# Patient Record
Sex: MALE | Race: WHITE | Employment: OTHER | URBAN - METROPOLITAN AREA
[De-identification: names, ages, dates, MRNs, and addresses within clinical notes are randomized per-mention and may not be internally consistent; named-entity substitution may affect disease eponyms.]

---

## 2017-02-21 DIAGNOSIS — Z94.7 HISTORY OF PENETRATING KERATOPLASTY: ICD-10-CM

## 2017-02-21 RX ORDER — FLUOROMETHOLONE 0.1 %
1 SUSPENSION, DROPS(FINAL DOSAGE FORM)(ML) OPHTHALMIC (EYE) 4 TIMES DAILY
Qty: 25 ML | Refills: 2 | Status: ON HOLD | OUTPATIENT
Start: 2017-02-21 | End: 2018-06-01

## 2017-02-21 NOTE — TELEPHONE ENCOUNTER
fluorometholone (FML LIQUIFILM) 0.1 % ophthalmic suspension  Last Written Prescription Date:  4/11/16  Last Fill Quantity: 3 bottle,   # refills: 3 rfs  Last Office Visit with [facility] or [facility] prescribing provider: 2/16/16  Future Office visit:   4/4/17      Modesto Farley MD   Ophthalmology    Post corneal transplant   Dx    Blurred Vision Right Eye; Referred by Self, Referred, MD   Reason for visit    Progress Notes      CC: 82 year old male presents for blurry vision s/p penetrating keratoplasty (PK) to right eye     HPI:  - patient notes right eye not seeing as well as it did before, states it is cloudy. Eyes are comfortable     Assessment/Plan:     1. S/P Triple penetrating keratoplasty (PK) OD 2000 with Dr. Lozano  - doing great clear graft  - 2 diopter increase in astigmatism since last visit  - obtain dinesh today  - updated glasses rx given  - continue FML qday     2. Pseudophakia both eyes  - s/p Cataract extraction with Dr. Varma 2013     3. Anterior capsular opacity, right eye:  - YAG capsulotomy performed at previous visit  - remains patent and clear  - vision improves to baseline with new glasses rx     4. Posterior capsular opacity (PCO), left eye:  - continue to monitor     5. 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 Jackman     7. Steroid responder per history  - intraocular pressure ok today        return to clinic 1 year, as needed as needed if new issues arise        ~~~~~~~~~~~~~~~~~~~~~~~~~~~~~~~~~~~~~~~~~~~~~~~~~~~~~~~~~~~~~~~~     I have personally examined the patient and agree with the assessment and plan as delineated by the resident / fellow. I have confirmed the contents of the chief complaint, history of present illness, review of systems, and medical / surgical history sections and edited the note as  needed.     Modesto Farley MD              Routing refill request to provider for review/approval because:   fluorometholone (FML LIQUIFILM) 0.1 % ophthalmic suspension.    per orders SO protocol   Pharmacy request 90 day supply  25ml

## 2017-03-03 ENCOUNTER — TRANSFERRED RECORDS (OUTPATIENT)
Dept: HEALTH INFORMATION MANAGEMENT | Facility: CLINIC | Age: 82
End: 2017-03-03

## 2017-04-04 ENCOUNTER — OFFICE VISIT (OUTPATIENT)
Dept: OPHTHALMOLOGY | Facility: CLINIC | Age: 82
End: 2017-04-04
Attending: OPHTHALMOLOGY
Payer: MEDICARE

## 2017-04-04 DIAGNOSIS — Z94.7 HISTORY OF PENETRATING KERATOPLASTY: Primary | ICD-10-CM

## 2017-04-04 DIAGNOSIS — B00.52 HERPES KERATITIS: ICD-10-CM

## 2017-04-04 PROCEDURE — 99213 OFFICE O/P EST LOW 20 MIN: CPT | Mod: ZF

## 2017-04-04 ASSESSMENT — SLIT LAMP EXAM - LIDS
COMMENTS: NORMAL
COMMENTS: NORMAL

## 2017-04-04 ASSESSMENT — REFRACTION_WEARINGRX
OD_SPHERE: -2.50
SPECS_TYPE: BIFOCAL
OD_CYLINDER: +4.50
OS_SPHERE: -2.50
OS_AXIS: 160
OD_ADD: +2.50
OD_AXIS: 160
OS_ADD: +2.50
OS_CYLINDER: +2.00

## 2017-04-04 ASSESSMENT — VISUAL ACUITY
CORRECTION_TYPE: GLASSES
METHOD: SNELLEN - LINEAR
OS_CC+: +2
OD_CC: 20/60
OD_CC+: -1
OS_CC: 20/50

## 2017-04-04 ASSESSMENT — EXTERNAL EXAM - RIGHT EYE: OD_EXAM: NORMAL

## 2017-04-04 ASSESSMENT — TONOMETRY
OS_IOP_MMHG: 12
OD_IOP_MMHG: 7
IOP_METHOD: TONOPEN

## 2017-04-04 ASSESSMENT — CONF VISUAL FIELD
OS_NORMAL: 1
OD_NORMAL: 1

## 2017-04-04 ASSESSMENT — CUP TO DISC RATIO
OS_RATIO: 0.4
OD_RATIO: 0.3

## 2017-04-04 ASSESSMENT — EXTERNAL EXAM - LEFT EYE: OS_EXAM: NORMAL

## 2017-04-04 NOTE — MR AVS SNAPSHOT
After Visit Summary   2017    Scott Knott    MRN: [id number]           Patient Information     Date Of Birth          1931        Visit Information        Provider Department      2017 9:30 AM [facility]        Today's Diagnoses     History of penetrating keratoplasty - Right Eye    -  1    Herpes keratitis - Right Eye           Follow-ups after your visit        Follow-up notes from your care team     Return in about 1 year (around 2018).      Who to contact     Please call your clinic at [phone] to:    Ask questions about your health    Make or cancel appointments    Discuss your medicines    Learn about your test results    Speak to your doctor   If you have compliments or concerns about an experience at your clinic, or if you wish to file a complaint, please contact [facility] Patient Relations at [phone] or email us at [email or web address]         Additional Information About Your Visit        MyChart Information     [employer] is an electronic gateway that provides easy, online access to your medical records. With [employer], you can request a clinic appointment, read your test results, renew a prescription or communicate with your care team.     To sign up for [employer] visit the website at [email or web address]   You will be asked to enter the access code listed below, as well as some personal information. Please follow the directions to create your username and password.     Your access code is: [email or web address]  Expires: 2017  8:30 AM     Your access code will  in 90 days. If you need help or a new code, please contact your [facility] or call [phone] for assistance.        Care EveryWhere ID     This is your Care EveryWhere ID. This could be used by other organizations to access your [place] medical records  [id number]         Blood Pressure from Last 3  Encounters:   No data found for BP    Weight from Last 3 Encounters:   No data found for Wt              Today, you had the following     No orders found for display       Primary Care Provider Office Phone # Fax #    Clarissa Tai [phone]       [facility] [place] [address]  [place] [address]        Thank you!     Thank you for choosing [facility]  for your care. Our goal is always to provide you with excellent care. Hearing back from our patients is one way we can continue to improve our services. Please take a few minutes to complete the written survey that you may receive in the mail after your visit with us. Thank you!             Your Updated Medication List - Protect others around you: Learn how to safely use, store and throw away your medicines at www.disposemymeds.org.          This list is accurate as of: 4/4/17 10:33 AM.  Always use your most recent med list.                   Brand Name Dispense Instructions for use    ACYCLOVIR PO      Take 400 mg by mouth daily       amLODIPine-atorvastatin 5-80 MG per tablet    CADUET     Take 1 tablet by mouth At Bedtime       ASPIRIN PO      Take 81 mg by mouth 2 times daily       ATENOLOL PO      100 mg 1/2 tablet 2 times per day       ATORVASTATIN CALCIUM PO          ETODOLAC PO      400 mg 2 times per day with meals       fluorometholone 0.1 % ophthalmic susp    FML LIQUIFILM    25 mL    Place 1 drop Into the left eye 4 times daily Instill one drop in the right eye qd.       HYDROCHLOROTHIAZIDE PO      Take by mouth daily 25 mg 1/2 tablet       ISOSORBIDE MONONITRATE PO      Take by mouth daily 1/2  Tablet    30 mg       * NITROGLYCERIN SL      Place 0.4 mg under the tongue as needed       * nitroglycerin 0.4 MG/HR 24 hr patch    NITRODUR     Place 1 patch onto the skin daily       OMEPRAZOLE PO          TRAMADOL HCL PO          * Notice:  This list has 2 medication(s) that are the same as other medications prescribed for you. Read the  directions carefully, and ask your doctor or other care provider to review them with you.

## 2017-04-04 NOTE — PROGRESS NOTES
"CC: 85 year old male presents for blurry vision s/p penetrating keratoplasty (PK) to right eye    HPI:  - For the past 6 months, has noted a few small \"bubbles surrounded by black rings\" in both eyes. Has seen his retina specialist Dr Jackman recently who thought things looked stable. Not significantly blocking vision, and are intermittent. Denies any dryness, pain, or significant irritation. Vision has been fairly stable. Continues on FML once daily in the right eye and is taking acyclovir 400mg BID.    Assessment/Plan:    1. S/P Triple penetrating keratoplasty (PK) OD 2000 with Dr. Lozano   - doing well with great clear graft, vision improved since updated glasses    - continue FML qday    2. Pseudophakia both eyes   - s/p Cataract extraction with Dr. Varma 2013    3. Posterior capsular opacity (PCO), left eye:   - continue to monitor    4. 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 Jackman  -retina flat, attached  -has gaps in vision/blind spot from previous macular hole repair  - floaters, longstanding    6. Steroid responder per history   - intraocular pressures great today      Esequiel Moreno MD  PGY3, Dept of Ophthalmology    Return to clinic 1 year earlier as needed     ~~~~~~~~~~~~~~~~~~~~~~~~~~~~~~~~~~~~~~~~~~~~~~~~~~~~~~~~~~~~~~~~    Complete documentation of historical and exam elements from today's encounter can be found in the full encounter summary report (not reduplicated in this progress note). I personally obtained the chief complaint(s) and history of present illness.  I confirmed and edited as necessary the review of systems, past medical/surgical history, family history, social history, and examination findings as documented by others.  I examined the patient myself, and I personally reviewed the relevant tests, images, " and reports as documented above. I formulated and edited as necessary the assessment and plan and discussed the findings and management plan with the patient and family.     Modesto Farley MD, MA  Director, Cornea & Anterior Segment  [facility] Department of Ophthalmology & Visual Neuroscience

## 2017-09-08 ENCOUNTER — TRANSFERRED RECORDS (OUTPATIENT)
Dept: HEALTH INFORMATION MANAGEMENT | Facility: CLINIC | Age: 82
End: 2017-09-08

## 2018-06-01 ENCOUNTER — ANESTHESIA EVENT (OUTPATIENT)
Dept: SURGERY | Facility: CLINIC | Age: 83
End: 2018-06-01
Payer: MEDICARE

## 2018-06-01 ENCOUNTER — ANESTHESIA (OUTPATIENT)
Dept: SURGERY | Facility: CLINIC | Age: 83
End: 2018-06-01
Payer: MEDICARE

## 2018-06-01 ENCOUNTER — HOSPITAL ENCOUNTER (OUTPATIENT)
Facility: CLINIC | Age: 83
Setting detail: OBSERVATION
Discharge: HOME | End: 2018-06-02
Attending: EMERGENCY MEDICINE | Admitting: SURGERY
Payer: MEDICARE

## 2018-06-01 DIAGNOSIS — Z48.810 SURGICAL AFTERCARE, SENSE ORGANS: ICD-10-CM

## 2018-06-01 DIAGNOSIS — Z94.7 HISTORY OF PENETRATING KERATOPLASTY: ICD-10-CM

## 2018-06-01 DIAGNOSIS — S02.30XA ORBITAL FLOOR (BLOW-OUT) CLOSED FRACTURE (H): Primary | ICD-10-CM

## 2018-06-01 LAB
ABO + RH BLD: NORMAL
ABO + RH BLD: NORMAL
ANION GAP SERPL CALCULATED.3IONS-SCNC: 8 MMOL/L (ref 3–14)
BASOPHILS # BLD AUTO: 0 10E9/L (ref 0–0.2)
BASOPHILS NFR BLD AUTO: 0.3 %
BLD GP AB SCN SERPL QL: NORMAL
BLOOD BANK CMNT PATIENT-IMP: NORMAL
BUN SERPL-MCNC: 16 MG/DL (ref 7–30)
CALCIUM SERPL-MCNC: 9.1 MG/DL (ref 8.5–10.1)
CHLORIDE SERPL-SCNC: 101 MMOL/L (ref 94–109)
CO2 SERPL-SCNC: 29 MMOL/L (ref 20–32)
CREAT SERPL-MCNC: 0.88 MG/DL (ref 0.66–1.25)
DIFFERENTIAL METHOD BLD: ABNORMAL
EOSINOPHIL # BLD AUTO: 0.1 10E9/L (ref 0–0.7)
EOSINOPHIL NFR BLD AUTO: 0.9 %
ERYTHROCYTE [DISTWIDTH] IN BLOOD BY AUTOMATED COUNT: 12.9 % (ref 10–15)
GFR SERPL CREATININE-BSD FRML MDRD: 83 ML/MIN/1.7M2
GLUCOSE SERPL-MCNC: 149 MG/DL (ref 70–99)
HCT VFR BLD AUTO: 40.8 % (ref 40–53)
HGB BLD-MCNC: 13.7 G/DL (ref 13.3–17.7)
IMM GRANULOCYTES # BLD: 0 10E9/L (ref 0–0.4)
IMM GRANULOCYTES NFR BLD: 0.3 %
INR PPP: 1.05 (ref 0.86–1.14)
LYMPHOCYTES # BLD AUTO: 1.9 10E9/L (ref 0.8–5.3)
LYMPHOCYTES NFR BLD AUTO: 17.2 %
MCH RBC QN AUTO: 31.3 PG (ref 26.5–33)
MCHC RBC AUTO-ENTMCNC: 33.6 G/DL (ref 31.5–36.5)
MCV RBC AUTO: 93 FL (ref 78–100)
MONOCYTES # BLD AUTO: 1.1 10E9/L (ref 0–1.3)
MONOCYTES NFR BLD AUTO: 9.6 %
NEUTROPHILS # BLD AUTO: 8 10E9/L (ref 1.6–8.3)
NEUTROPHILS NFR BLD AUTO: 71.7 %
NRBC # BLD AUTO: 0 10*3/UL
NRBC BLD AUTO-RTO: 0 /100
PLATELET # BLD AUTO: 333 10E9/L (ref 150–450)
POTASSIUM SERPL-SCNC: 4.1 MMOL/L (ref 3.4–5.3)
RBC # BLD AUTO: 4.38 10E12/L (ref 4.4–5.9)
SODIUM SERPL-SCNC: 138 MMOL/L (ref 133–144)
SPECIMEN EXP DATE BLD: NORMAL
WBC # BLD AUTO: 11.1 10E9/L (ref 4–11)

## 2018-06-01 PROCEDURE — 80048 BASIC METABOLIC PNL TOTAL CA: CPT | Performed by: EMERGENCY MEDICINE

## 2018-06-01 PROCEDURE — 36000062 ZZH SURGERY LEVEL 4 1ST 30 MIN - UMMC: Performed by: OPHTHALMOLOGY

## 2018-06-01 PROCEDURE — 27210794 ZZH OR GENERAL SUPPLY STERILE: Performed by: OPHTHALMOLOGY

## 2018-06-01 PROCEDURE — 25000566 ZZH SEVOFLURANE, EA 15 MIN: Performed by: OPHTHALMOLOGY

## 2018-06-01 PROCEDURE — 25000128 H RX IP 250 OP 636: Performed by: STUDENT IN AN ORGANIZED HEALTH CARE EDUCATION/TRAINING PROGRAM

## 2018-06-01 PROCEDURE — 25000125 ZZHC RX 250: Performed by: OPHTHALMOLOGY

## 2018-06-01 PROCEDURE — C9399 UNCLASSIFIED DRUGS OR BIOLOG: HCPCS | Performed by: STUDENT IN AN ORGANIZED HEALTH CARE EDUCATION/TRAINING PROGRAM

## 2018-06-01 PROCEDURE — 25000128 H RX IP 250 OP 636: Performed by: OPHTHALMOLOGY

## 2018-06-01 PROCEDURE — 37000009 ZZH ANESTHESIA TECHNICAL FEE, EACH ADDTL 15 MIN: Performed by: OPHTHALMOLOGY

## 2018-06-01 PROCEDURE — 85610 PROTHROMBIN TIME: CPT | Performed by: EMERGENCY MEDICINE

## 2018-06-01 PROCEDURE — 37000008 ZZH ANESTHESIA TECHNICAL FEE, 1ST 30 MIN: Performed by: OPHTHALMOLOGY

## 2018-06-01 PROCEDURE — 93005 ELECTROCARDIOGRAM TRACING: CPT | Performed by: EMERGENCY MEDICINE

## 2018-06-01 PROCEDURE — 71000014 ZZH RECOVERY PHASE 1 LEVEL 2 FIRST HR: Performed by: OPHTHALMOLOGY

## 2018-06-01 PROCEDURE — 99285 EMERGENCY DEPT VISIT HI MDM: CPT | Mod: 25 | Performed by: EMERGENCY MEDICINE

## 2018-06-01 PROCEDURE — 86901 BLOOD TYPING SEROLOGIC RH(D): CPT | Performed by: EMERGENCY MEDICINE

## 2018-06-01 PROCEDURE — 40000170 ZZH STATISTIC PRE-PROCEDURE ASSESSMENT II: Performed by: OPHTHALMOLOGY

## 2018-06-01 PROCEDURE — 25000125 ZZHC RX 250: Performed by: STUDENT IN AN ORGANIZED HEALTH CARE EDUCATION/TRAINING PROGRAM

## 2018-06-01 PROCEDURE — 68200002 ZZH TRAUMA EVALUATION W/O CC LEVEL II: Performed by: EMERGENCY MEDICINE

## 2018-06-01 PROCEDURE — 40000065 ZZH STATISTIC EKG NON-CHARGEABLE: Performed by: EMERGENCY MEDICINE

## 2018-06-01 PROCEDURE — 86900 BLOOD TYPING SEROLOGIC ABO: CPT | Performed by: EMERGENCY MEDICINE

## 2018-06-01 PROCEDURE — 25000128 H RX IP 250 OP 636: Performed by: EMERGENCY MEDICINE

## 2018-06-01 PROCEDURE — 93010 ELECTROCARDIOGRAM REPORT: CPT | Mod: Z6 | Performed by: EMERGENCY MEDICINE

## 2018-06-01 PROCEDURE — 86850 RBC ANTIBODY SCREEN: CPT | Performed by: EMERGENCY MEDICINE

## 2018-06-01 PROCEDURE — 85025 COMPLETE CBC W/AUTO DIFF WBC: CPT | Performed by: EMERGENCY MEDICINE

## 2018-06-01 PROCEDURE — 36000064 ZZH SURGERY LEVEL 4 EA 15 ADDTL MIN - UMMC: Performed by: OPHTHALMOLOGY

## 2018-06-01 RX ORDER — ATORVASTATIN CALCIUM 40 MG/1
40 TABLET, FILM COATED ORAL AT BEDTIME
COMMUNITY

## 2018-06-01 RX ORDER — NEOMYCIN SULFATE, POLYMYXIN B SULFATE, AND DEXAMETHASONE 3.5; 10000; 1 MG/G; [USP'U]/G; MG/G
1 OINTMENT OPHTHALMIC 4 TIMES DAILY
Qty: 1 TUBE | Refills: 0 | Status: SHIPPED | OUTPATIENT
Start: 2018-06-01 | End: 2019-01-17

## 2018-06-01 RX ORDER — SODIUM CHLORIDE, SODIUM LACTATE, POTASSIUM CHLORIDE, CALCIUM CHLORIDE 600; 310; 30; 20 MG/100ML; MG/100ML; MG/100ML; MG/100ML
INJECTION, SOLUTION INTRAVENOUS CONTINUOUS
Status: DISCONTINUED | OUTPATIENT
Start: 2018-06-01 | End: 2018-06-02 | Stop reason: HOSPADM

## 2018-06-01 RX ORDER — HYDROMORPHONE HYDROCHLORIDE 1 MG/ML
0.2 INJECTION, SOLUTION INTRAMUSCULAR; INTRAVENOUS; SUBCUTANEOUS
Status: CANCELLED | OUTPATIENT
Start: 2018-06-01

## 2018-06-01 RX ORDER — TRAMADOL HYDROCHLORIDE 50 MG/1
50-100 TABLET ORAL EVERY 4 HOURS PRN
COMMUNITY

## 2018-06-01 RX ORDER — PREDNISOLONE ACETATE 10 MG/ML
1 SUSPENSION/ DROPS OPHTHALMIC 4 TIMES DAILY
Qty: 1 BOTTLE | Refills: 0 | Status: SHIPPED | OUTPATIENT
Start: 2018-06-01 | End: 2018-10-04

## 2018-06-01 RX ORDER — SODIUM CHLORIDE, SODIUM LACTATE, POTASSIUM CHLORIDE, CALCIUM CHLORIDE 600; 310; 30; 20 MG/100ML; MG/100ML; MG/100ML; MG/100ML
INJECTION, SOLUTION INTRAVENOUS CONTINUOUS
Status: CANCELLED | OUTPATIENT
Start: 2018-06-01

## 2018-06-01 RX ORDER — ONDANSETRON 4 MG/1
4 TABLET, ORALLY DISINTEGRATING ORAL EVERY 30 MIN PRN
Status: DISCONTINUED | OUTPATIENT
Start: 2018-06-01 | End: 2018-06-02 | Stop reason: HOSPADM

## 2018-06-01 RX ORDER — HYDROMORPHONE HYDROCHLORIDE 1 MG/ML
.3-.5 INJECTION, SOLUTION INTRAMUSCULAR; INTRAVENOUS; SUBCUTANEOUS EVERY 5 MIN PRN
Status: DISCONTINUED | OUTPATIENT
Start: 2018-06-01 | End: 2018-06-02 | Stop reason: HOSPADM

## 2018-06-01 RX ORDER — ATENOLOL 50 MG/1
50 TABLET ORAL 2 TIMES DAILY
COMMUNITY

## 2018-06-01 RX ORDER — AMLODIPINE BESYLATE 5 MG/1
7.5 TABLET ORAL DAILY
COMMUNITY

## 2018-06-01 RX ORDER — LABETALOL HYDROCHLORIDE 5 MG/ML
10 INJECTION, SOLUTION INTRAVENOUS
Status: DISCONTINUED | OUTPATIENT
Start: 2018-06-01 | End: 2018-06-02 | Stop reason: HOSPADM

## 2018-06-01 RX ORDER — NITROGLYCERIN 0.4 MG/1
0.4 TABLET SUBLINGUAL EVERY 5 MIN PRN
COMMUNITY

## 2018-06-01 RX ORDER — ACYCLOVIR 400 MG/1
400 TABLET ORAL DAILY
COMMUNITY

## 2018-06-01 RX ORDER — SULFASALAZINE 500 MG/1
500 TABLET ORAL 4 TIMES DAILY PRN
COMMUNITY

## 2018-06-01 RX ORDER — ONDANSETRON 2 MG/ML
4 INJECTION INTRAMUSCULAR; INTRAVENOUS EVERY 30 MIN PRN
Status: CANCELLED | OUTPATIENT
Start: 2018-06-01

## 2018-06-01 RX ORDER — LEVOFLOXACIN 5 MG/ML
750 INJECTION, SOLUTION INTRAVENOUS ONCE
Status: COMPLETED | OUTPATIENT
Start: 2018-06-01 | End: 2018-06-01

## 2018-06-01 RX ORDER — PROPOFOL 10 MG/ML
INJECTION, EMULSION INTRAVENOUS PRN
Status: DISCONTINUED | OUTPATIENT
Start: 2018-06-01 | End: 2018-06-01

## 2018-06-01 RX ORDER — SODIUM CHLORIDE, SODIUM LACTATE, POTASSIUM CHLORIDE, CALCIUM CHLORIDE 600; 310; 30; 20 MG/100ML; MG/100ML; MG/100ML; MG/100ML
INJECTION, SOLUTION INTRAVENOUS CONTINUOUS PRN
Status: DISCONTINUED | OUTPATIENT
Start: 2018-06-01 | End: 2018-06-01

## 2018-06-01 RX ORDER — BALANCED SALT SOLUTION 6.4; .75; .48; .3; 3.9; 1.7 MG/ML; MG/ML; MG/ML; MG/ML; MG/ML; MG/ML
SOLUTION OPHTHALMIC PRN
Status: DISCONTINUED | OUTPATIENT
Start: 2018-06-01 | End: 2018-06-02 | Stop reason: HOSPADM

## 2018-06-01 RX ORDER — OFLOXACIN 3 MG/ML
1 SOLUTION/ DROPS OPHTHALMIC 4 TIMES DAILY
Qty: 1 BOTTLE | Refills: 11 | Status: SHIPPED | OUTPATIENT
Start: 2018-06-01 | End: 2018-06-01

## 2018-06-01 RX ORDER — TOBRAMYCIN AND DEXAMETHASONE 3; 1 MG/ML; MG/ML
SUSPENSION/ DROPS OPHTHALMIC PRN
Status: DISCONTINUED | OUTPATIENT
Start: 2018-06-01 | End: 2018-06-02 | Stop reason: HOSPADM

## 2018-06-01 RX ORDER — NALOXONE HYDROCHLORIDE 0.4 MG/ML
.1-.4 INJECTION, SOLUTION INTRAMUSCULAR; INTRAVENOUS; SUBCUTANEOUS
Status: CANCELLED | OUTPATIENT
Start: 2018-06-01 | End: 2018-06-02

## 2018-06-01 RX ORDER — HYDROCODONE BITARTRATE AND ACETAMINOPHEN 5; 325 MG/1; MG/1
1-2 TABLET ORAL 3 TIMES DAILY PRN
COMMUNITY

## 2018-06-01 RX ORDER — OXYCODONE AND ACETAMINOPHEN 5; 325 MG/1; MG/1
1 TABLET ORAL EVERY 6 HOURS PRN
Qty: 12 TABLET | Refills: 0 | Status: SHIPPED | OUTPATIENT
Start: 2018-06-01 | End: 2018-10-04

## 2018-06-01 RX ORDER — ISOSORBIDE MONONITRATE 30 MG/1
15 TABLET, EXTENDED RELEASE ORAL DAILY
COMMUNITY

## 2018-06-01 RX ORDER — EPHEDRINE SULFATE 50 MG/ML
INJECTION, SOLUTION INTRAMUSCULAR; INTRAVENOUS; SUBCUTANEOUS PRN
Status: DISCONTINUED | OUTPATIENT
Start: 2018-06-01 | End: 2018-06-01

## 2018-06-01 RX ORDER — FENTANYL CITRATE 50 UG/ML
25-50 INJECTION, SOLUTION INTRAMUSCULAR; INTRAVENOUS
Status: DISCONTINUED | OUTPATIENT
Start: 2018-06-01 | End: 2018-06-02 | Stop reason: HOSPADM

## 2018-06-01 RX ORDER — FENTANYL CITRATE 50 UG/ML
INJECTION, SOLUTION INTRAMUSCULAR; INTRAVENOUS PRN
Status: DISCONTINUED | OUTPATIENT
Start: 2018-06-01 | End: 2018-06-01

## 2018-06-01 RX ORDER — ONDANSETRON 4 MG/1
4 TABLET, ORALLY DISINTEGRATING ORAL EVERY 30 MIN PRN
Status: CANCELLED | OUTPATIENT
Start: 2018-06-01

## 2018-06-01 RX ORDER — ONDANSETRON 2 MG/ML
4 INJECTION INTRAMUSCULAR; INTRAVENOUS EVERY 30 MIN PRN
Status: DISCONTINUED | OUTPATIENT
Start: 2018-06-01 | End: 2018-06-02 | Stop reason: HOSPADM

## 2018-06-01 RX ORDER — FENTANYL CITRATE 50 UG/ML
25-50 INJECTION, SOLUTION INTRAMUSCULAR; INTRAVENOUS EVERY 5 MIN PRN
Status: CANCELLED | OUTPATIENT
Start: 2018-06-01

## 2018-06-01 RX ORDER — OFLOXACIN 3 MG/ML
1 SOLUTION/ DROPS OPHTHALMIC 4 TIMES DAILY
Qty: 1 BOTTLE | Refills: 11 | Status: SHIPPED | OUTPATIENT
Start: 2018-06-01 | End: 2018-08-02

## 2018-06-01 RX ORDER — ETODOLAC 400 MG
400 TABLET ORAL 2 TIMES DAILY
Status: ON HOLD | COMMUNITY
End: 2018-06-02

## 2018-06-01 RX ORDER — FLUOROMETHOLONE 0.1 %
1 SUSPENSION, DROPS(FINAL DOSAGE FORM)(ML) OPHTHALMIC (EYE) AT BEDTIME
Status: ON HOLD | COMMUNITY
End: 2018-06-02

## 2018-06-01 RX ORDER — DEXAMETHASONE SODIUM PHOSPHATE 4 MG/ML
INJECTION, SOLUTION INTRA-ARTICULAR; INTRALESIONAL; INTRAMUSCULAR; INTRAVENOUS; SOFT TISSUE PRN
Status: DISCONTINUED | OUTPATIENT
Start: 2018-06-01 | End: 2018-06-02 | Stop reason: HOSPADM

## 2018-06-01 RX ORDER — ERYTHROMYCIN 5 MG/G
1 OINTMENT OPHTHALMIC 4 TIMES DAILY
Qty: 1 TUBE | Refills: 1 | Status: SHIPPED | OUTPATIENT
Start: 2018-06-01 | End: 2018-06-01

## 2018-06-01 RX ORDER — LIDOCAINE HYDROCHLORIDE AND EPINEPHRINE 10; 10 MG/ML; UG/ML
INJECTION, SOLUTION INFILTRATION; PERINEURAL PRN
Status: DISCONTINUED | OUTPATIENT
Start: 2018-06-01 | End: 2018-06-02 | Stop reason: HOSPADM

## 2018-06-01 RX ORDER — OXYCODONE AND ACETAMINOPHEN 5; 325 MG/1; MG/1
1 TABLET ORAL EVERY 6 HOURS PRN
Qty: 20 TABLET | Refills: 0 | Status: SHIPPED | OUTPATIENT
Start: 2018-06-01 | End: 2018-10-04

## 2018-06-01 RX ORDER — NEOMYCIN SULFATE, POLYMYXIN B SULFATE, AND DEXAMETHASONE 3.5; 10000; 1 MG/G; [USP'U]/G; MG/G
1 OINTMENT OPHTHALMIC 4 TIMES DAILY
Qty: 1 TUBE | Refills: 0 | Status: SHIPPED | OUTPATIENT
Start: 2018-06-01 | End: 2018-06-01

## 2018-06-01 RX ORDER — ONDANSETRON 2 MG/ML
INJECTION INTRAMUSCULAR; INTRAVENOUS PRN
Status: DISCONTINUED | OUTPATIENT
Start: 2018-06-01 | End: 2018-06-01

## 2018-06-01 RX ORDER — PREDNISOLONE ACETATE 10 MG/ML
1 SUSPENSION/ DROPS OPHTHALMIC 4 TIMES DAILY
Qty: 1 BOTTLE | Refills: 0 | Status: SHIPPED | OUTPATIENT
Start: 2018-06-01 | End: 2018-06-01

## 2018-06-01 RX ORDER — LIDOCAINE HYDROCHLORIDE 20 MG/ML
INJECTION, SOLUTION INFILTRATION; PERINEURAL PRN
Status: DISCONTINUED | OUTPATIENT
Start: 2018-06-01 | End: 2018-06-01

## 2018-06-01 RX ADMIN — PHENYLEPHRINE HYDROCHLORIDE 100 MCG: 10 INJECTION, SOLUTION INTRAMUSCULAR; INTRAVENOUS; SUBCUTANEOUS at 20:50

## 2018-06-01 RX ADMIN — PHENYLEPHRINE HYDROCHLORIDE 100 MCG: 10 INJECTION, SOLUTION INTRAMUSCULAR; INTRAVENOUS; SUBCUTANEOUS at 20:46

## 2018-06-01 RX ADMIN — Medication 5 MG: at 21:08

## 2018-06-01 RX ADMIN — SODIUM CHLORIDE, POTASSIUM CHLORIDE, SODIUM LACTATE AND CALCIUM CHLORIDE: 600; 310; 30; 20 INJECTION, SOLUTION INTRAVENOUS at 20:04

## 2018-06-01 RX ADMIN — HYDROMORPHONE HYDROCHLORIDE 0.2 MG: 1 INJECTION, SOLUTION INTRAMUSCULAR; INTRAVENOUS; SUBCUTANEOUS at 22:27

## 2018-06-01 RX ADMIN — Medication 5 MG: at 22:07

## 2018-06-01 RX ADMIN — SUGAMMADEX 200 MG: 100 INJECTION, SOLUTION INTRAVENOUS at 23:38

## 2018-06-01 RX ADMIN — PROPOFOL 160 MG: 10 INJECTION, EMULSION INTRAVENOUS at 20:11

## 2018-06-01 RX ADMIN — ONDANSETRON 4 MG: 2 INJECTION INTRAMUSCULAR; INTRAVENOUS at 23:33

## 2018-06-01 RX ADMIN — HYDROMORPHONE HYDROCHLORIDE 0.3 MG: 1 INJECTION, SOLUTION INTRAMUSCULAR; INTRAVENOUS; SUBCUTANEOUS at 22:58

## 2018-06-01 RX ADMIN — PHENYLEPHRINE HYDROCHLORIDE 100 MCG: 10 INJECTION, SOLUTION INTRAMUSCULAR; INTRAVENOUS; SUBCUTANEOUS at 20:44

## 2018-06-01 RX ADMIN — PHENYLEPHRINE HYDROCHLORIDE 100 MCG: 10 INJECTION, SOLUTION INTRAMUSCULAR; INTRAVENOUS; SUBCUTANEOUS at 21:10

## 2018-06-01 RX ADMIN — PHENYLEPHRINE HYDROCHLORIDE 50 MCG: 10 INJECTION, SOLUTION INTRAMUSCULAR; INTRAVENOUS; SUBCUTANEOUS at 20:29

## 2018-06-01 RX ADMIN — PHENYLEPHRINE HYDROCHLORIDE 100 MCG: 10 INJECTION, SOLUTION INTRAMUSCULAR; INTRAVENOUS; SUBCUTANEOUS at 22:19

## 2018-06-01 RX ADMIN — ROCURONIUM BROMIDE 20 MG: 10 INJECTION INTRAVENOUS at 22:21

## 2018-06-01 RX ADMIN — LEVOFLOXACIN 750 MG: 5 INJECTION, SOLUTION INTRAVENOUS at 20:18

## 2018-06-01 RX ADMIN — FENTANYL CITRATE 50 MCG: 50 INJECTION, SOLUTION INTRAMUSCULAR; INTRAVENOUS at 23:44

## 2018-06-01 RX ADMIN — ROCURONIUM BROMIDE 100 MG: 10 INJECTION INTRAVENOUS at 20:11

## 2018-06-01 RX ADMIN — PHENYLEPHRINE HYDROCHLORIDE 0.3 MCG/KG/MIN: 10 INJECTION, SOLUTION INTRAMUSCULAR; INTRAVENOUS; SUBCUTANEOUS at 20:51

## 2018-06-01 RX ADMIN — PHENYLEPHRINE HYDROCHLORIDE 100 MCG: 10 INJECTION, SOLUTION INTRAMUSCULAR; INTRAVENOUS; SUBCUTANEOUS at 20:38

## 2018-06-01 RX ADMIN — FENTANYL CITRATE 50 MCG: 50 INJECTION, SOLUTION INTRAMUSCULAR; INTRAVENOUS at 20:11

## 2018-06-01 RX ADMIN — PROPOFOL 40 MG: 10 INJECTION, EMULSION INTRAVENOUS at 23:40

## 2018-06-01 RX ADMIN — LIDOCAINE HYDROCHLORIDE 100 MG: 20 INJECTION, SOLUTION INFILTRATION; PERINEURAL at 20:11

## 2018-06-01 RX ADMIN — PHENYLEPHRINE HYDROCHLORIDE 50 MCG: 10 INJECTION, SOLUTION INTRAMUSCULAR; INTRAVENOUS; SUBCUTANEOUS at 20:21

## 2018-06-01 ASSESSMENT — ENCOUNTER SYMPTOMS
NECK PAIN: 0
ROS SKIN COMMENTS: SEE HPI
DIARRHEA: 0
NUMBNESS: 0
WEAKNESS: 0
SHORTNESS OF BREATH: 0
VOMITING: 0

## 2018-06-01 NOTE — IP AVS SNAPSHOT
[address] [address]    Phone:  [phone]                                       After Visit Summary   6/1/2018    Scott Knott    MRN: [id number]           After Visit Summary Signature Page     I have received my discharge instructions, and my questions have been answered. I have discussed any challenges I see with this plan with the nurse or doctor.    ..........................................................................................................................................  Patient/Patient Representative Signature      ..........................................................................................................................................  Patient Representative Print Name and Relationship to Patient    ..................................................               ................................................  Date                                            Time    ..........................................................................................................................................  Reviewed by Signature/Title    ...................................................              ..............................................  Date                                                            Time

## 2018-06-01 NOTE — ED TRIAGE NOTES
Pt was on his riding  this morning and around 11am  As he was trying to go up [facility] to mow the tractor flipped over and the blancas of the mower hit his rt eye, pt was wearing his glasses and he states his glasses were pushed into that rt eye, pt has a patch over his rt eye, pain is getting worse

## 2018-06-01 NOTE — ED NOTES
Bed: IN03  Expected date:   Expected time:   Means of arrival:   Comments:  lia Knott [birth year]   Hx of corneal tranplant 2000, ecchymosis, iris avulsion, 8-11:00, inferior wall fracture, followed by Dr. Farley here at the [facility]; trauma and ophthalmology are notified, referring ophthalmologist Dr. Bacilio Zepeda who can be reached at [phone]

## 2018-06-01 NOTE — IP AVS SNAPSHOT
MRN:[id number]                      After Visit Summary   6/1/2018    Scott Knott    MRN: [id number]           Thank you!     Thank you for choosing [facility] for your care. Our goal is always to provide you with excellent care. Hearing back from our patients is one way we can continue to improve our services. Please take a few minutes to complete the written survey that you may receive in the mail after you visit with us. Thank you!        Patient Information     Date Of Birth          8/16/1931        About your hospital stay     You were admitted on:  June 1, 2018 You last received care in the:  [address] [facility]    You were discharged on:  June 2, 2018        Reason for your hospital stay       1. Penetrating keratoplasty dehiscence with uveal prolapse, right eye  2. Eyelid laceration, right upper eyelid        Procedure performed:06/01  1. Repair of ruptured globe, right eye  2. Repair of non-margin involving right upper lid laceration                  Who to Call     For medical emergencies, please call 911.  For non-urgent questions about your medical care, please call your primary care provider or clinic, [phone]  For questions related to your surgery, please call your surgery clinic        Attending Provider     Provider Specialty    Talat Benoit MD Emergency Medicine    [facility]       Primary Care Provider Office Phone # Fax #    Clarissa Tai [phone]      After Care Instructions     Activity       Your activity upon discharge: activity as tolerated  -Shield at all times  -do not bend over  -do not lift more than a gallon of weight  -avoid dirty water in eye            Diet       Follow this diet upon discharge: Regular            Wound care and dressings       Instructions to care for your wound at home: as directed.                  Follow-up Appointments     Adult [facility] Follow-up and  "recommended labs and tests       Oral and Maxillofacial Surgeons: Orbital wall fracture was discussed with on call oculoplastics fellow, Dr. Coker.  [address] [place]  [address] [place] [address]  Appointments: [phone]    Ophthalmology (Eye) Clinic:  Follow up with plastic on chedule follow up with Oculoplastic and retina on Monday  [address]   [address] [place] [address]   Appointments: [phone]     Appointments on [place] and/or [facility] (with [facility] or [facility] provider or service). Call [phone] if you haven't heard regarding these appointments within 7 days of discharge.                  Pending Results     Date and Time Order Name Status Description    2018 1859 EKG 12-lead, tracing only Preliminary             Admission Information     Date & Time Provider Department Dept. Phone    2018 Enoc Cooper MD [address] [facility] [place] [phone]      Your Vitals Were     Blood Pressure Pulse Temperature Respirations Height Weight    140/71 (BP Location: Left arm) 82 98.1  F (36.7  C) (Oral) 14 1.778 m (5' 10\") 104.3 kg (230 lb)    Pulse Oximetry BMI (Body Mass Index)                98% 33 kg/m2          Get[employer] Information     Get[employer] lets you send messages to your doctor, view your test results, renew your prescriptions, schedule appointments and more. To sign up, go to [email or web address] . Click on \"Log in\" on the left side of the screen, which will take you to the Welcome page. Then click on \"Sign up Now\" on the right side of the page.     You will be asked to enter the access code listed below, as well as some personal information. Please follow the directions to create your username and password.     Your access code is: [email or web address]  Expires: 2018  9:48 AM     Your access code will  in 90 days. If you need help or a new code, please call your [facility] clinic or [phone].        Care EveryWhere ID     This is " your Care EveryWhere ID. This could be used by other organizations to access your [facility] medical records  [id number]        Equal Access to Services     GORDON BROWN : Hadii clement Maloney, waroseda winstonkandyha, nadinetamra kaspencer prestontad, rocco taliain hayaalotus johnstonkev kevonellibrandi wuLadi Kothari Redwood [phone].    ATENCIÓN: Si habla español, tiene a patel disposición servicios gratuitos de asistencia lingüística. Llame al [phone].    We comply with applicable federal civil rights laws and [place] laws. We do not discriminate on the basis of race, color, national origin, age, disability, sex, sexual orientation, or gender identity.               Review of your medicines      START taking        Dose / Directions    neomycin-polymyxin-dexamethasone 3.5-64831-9.1 Oint ophthalmic ointment   Commonly known as:  MAXITROL   Used for:  Surgical aftercare, sense organs        Dose:  1 Application   Place 1 Application into the right eye 4 times daily On eyelid   Quantity:  1 Tube   Refills:  0       ofloxacin 0.3 % ophthalmic solution   Commonly known as:  OCUFLOX   Used for:  Surgical aftercare, sense organs        Dose:  1 drop   Place 1 drop into the right eye 4 times daily   Quantity:  1 Bottle   Refills:  11       * oxyCODONE-acetaminophen 5-325 MG per tablet   Commonly known as:  PERCOCET   Used for:  Surgical aftercare, sense organs        Dose:  1 tablet   Take 1 tablet by mouth every 6 hours as needed for pain   Quantity:  12 tablet   Refills:  0       * oxyCODONE-acetaminophen 5-325 MG per tablet   Commonly known as:  PERCOCET   Used for:  Surgical aftercare, sense organs, History of penetrating keratoplasty        Dose:  1 tablet   Take 1 tablet by mouth every 6 hours as needed for pain   Quantity:  20 tablet   Refills:  0       prednisoLONE acetate 1 % ophthalmic susp   Commonly known as:  PRED FORTE   Used for:  Surgical aftercare, sense organs        Dose:  1 drop   Place 1 drop into the right eye 4 times  daily   Quantity:  1 Bottle   Refills:  0       * Notice:  This list has 2 medication(s) that are the same as other medications prescribed for you. Read the directions carefully, and ask your doctor or other care provider to review them with you.      CONTINUE these medicines which have NOT CHANGED        Dose / Directions    acyclovir 400 MG tablet   Commonly known as:  ZOVIRAX        Dose:  400 mg   Take 400 mg by mouth daily   Refills:  0       amLODIPine 5 MG tablet   Commonly known as:  NORVASC        Dose:  7.5 mg   Take 7.5 mg by mouth daily   Refills:  0       atenolol 50 MG tablet   Commonly known as:  TENORMIN        Dose:  50 mg   Take 50 mg by mouth 2 times daily   Refills:  0       atorvastatin 40 MG tablet   Commonly known as:  LIPITOR        Dose:  40 mg   Take 40 mg by mouth At Bedtime   Refills:  0       HYDROcodone-acetaminophen 5-325 MG per tablet   Commonly known as:  NORCO        Dose:  1-2 tablet   Take 1-2 tablets by mouth 3 times daily as needed for severe pain   Refills:  0       isosorbide mononitrate 30 MG 24 hr tablet   Commonly known as:  IMDUR        Dose:  15 mg   Take 15 mg by mouth daily   Refills:  0       nitroGLYcerin 0.4 MG sublingual tablet   Commonly known as:  NITROSTAT        Dose:  0.4 mg   Place 0.4 mg under the tongue every 5 minutes as needed for chest pain For chest pain place 1 tablet under the tongue every 5 minutes for 3 doses. If symptoms persist 5 minutes after 1st dose call 911.   Refills:  0       omeprazole 20 MG CR capsule   Commonly known as:  priLOSEC        Dose:  20 mg   Take 20 mg by mouth daily   Refills:  0       sulfaSALAzine 500 MG tablet   Commonly known as:  AZULFIDINE        Dose:  500 mg   Take 500 mg by mouth 4 times daily as needed (colitis)   Refills:  0       traMADol 50 MG tablet   Commonly known as:  ULTRAM        Dose:   mg   Take  mg by mouth every 4 hours as needed for pain or severe pain   Refills:  0         STOP taking      aspirin 81 MG tablet           etodolac 400 MG tablet   Commonly known as:  LODINE           fluorometholone 0.1 % ophthalmic susp   Commonly known as:  FML LIQUIFILM                Where to get your medicines      Some of these will need a paper prescription and others can be bought over the counter. Ask your nurse if you have questions.     Bring a paper prescription for each of these medications     neomycin-polymyxin-dexamethasone 3.5-28695-9.1 Oint ophthalmic ointment    ofloxacin 0.3 % ophthalmic solution    oxyCODONE-acetaminophen 5-325 MG per tablet    oxyCODONE-acetaminophen 5-325 MG per tablet    prednisoLONE acetate 1 % ophthalmic susp                Protect others around you: Learn how to safely use, store and throw away your medicines at www.disposemymeds.org.        ANTIBIOTIC INSTRUCTION     You've Been Prescribed an Antibiotic - Now What?  Your healthcare team thinks that you or your loved one might have an infection. Some infections can be treated with antibiotics, which are powerful, life-saving drugs. Like all medications, antibiotics have side effects and should only be used when necessary. There are some important things you should know about your antibiotic treatment.      Your healthcare team may run tests before you start taking an antibiotic.    Your team may take samples (e.g., from your blood, urine or other areas) to run tests to look for bacteria. These test can be important to determine if you need an antibiotic at all and, if you do, which antibiotic will work best.      Within a few days, your healthcare team might change or even stop your antibiotic.    Your team may start you on an antibiotic while they are working to find out what is making you sick.    Your team might change your antibiotic because test results show that a different antibiotic would be better to treat your infection.    In some cases, once your team has more information, they learn that you do not need an  antibiotic at all. They may find out that you don't have an infection, or that the antibiotic you're taking won't work against your infection. For example, an infection caused by a virus can't be treated with antibiotics. Staying on an antibiotic when you don't need it is more likely to be harmful than helpful.      You may experience side effects from your antibiotic.    Like all medications, antibiotics have side effects. Some of these can be serious.    Let you healthcare team know if you have any known allergies when you are admitted to the hospital.    One significant side effect of nearly all antibiotics is the risk of severe and sometimes deadly diarrhea caused by Clostridium difficile (C. Difficile). This occurs when a person takes antibiotics because some good germs are destroyed. Antibiotic use allows C. diificile to take over, putting patients at high risk for this serious infection.    As a patient or caregiver, it is important to understand your or your loved one's antibiotic treatment. It is especially important for caregivers to speak up when patients can't speak for themselves. Here are some important questions to ask your healthcare team.    What infection is this antibiotic treating and how do you know I have that infection?    What side effects might occur from this antibiotic?    How long will I need to take this antibiotic?    Is it safe to take this antibiotic with other medications or supplements (e.g., vitamins) that I am taking?     Are there any special directions I need to know about taking this antibiotic? For example, should I take it with food?    How will I be monitored to know whether my infection is responding to the antibiotic?    What tests may help to make sure the right antibiotic is prescribed for me?      Information provided by:  www.cdc.gov/getsmart  U.S. Department of Health and Human Services  Centers for disease Control and Prevention  National Center for Emerging and  Zoonotic Infectious Diseases  Division of Healthcare Quality Promotion        Information about OPIOIDS     PRESCRIPTION OPIOIDS: WHAT YOU NEED TO KNOW   You have a prescription for an opioid (narcotic) pain medicine. Opioids can cause addiction. If you have a history of chemical dependency of any type, you are at a higher risk of becoming addicted to opioids. Only take this medicine after all other options have been tried. Take it for as short a time and as few doses as possible.     Do not:    Drive. If you drive while taking these medicines, you could be arrested for driving under the influence (DUI).    Operate heavy machinery    Do any other dangerous activities while taking these medicines.     Drink any alcohol while taking these medicines.      Take with any other medicines that contain acetaminophen. Read all labels carefully. Look for the word  acetaminophen  or  Tylenol.  Ask your pharmacist if you have questions or are unsure.    Store your pills in a secure place, locked if possible. We will not replace any lost or stolen medicine. If you don t finish your medicine, please throw away (dispose) as directed by your pharmacist. The [place] Pollution Control Agency has more information about safe disposal: https://www.pca.[facility].[place].us/living-green/managing-unwanted-medications    All opioids tend to cause constipation. Drink plenty of water and eat foods that have a lot of fiber, such as fruits, vegetables, prune juice, apple juice and high-fiber cereal. Take a laxative (Miralax, milk of magnesia, Colace, Senna) if you don t move your bowels at least every other day.              Medication List: This is a list of all your medications and when to take them. Check marks below indicate your daily home schedule. Keep this list as a reference.      Medications           Morning Afternoon Evening Bedtime As Needed    acyclovir 400 MG tablet   Commonly known as:  ZOVIRAX   Take 400 mg by mouth daily                                 amLODIPine 5 MG tablet   Commonly known as:  NORVASC   Take 7.5 mg by mouth daily                                atenolol 50 MG tablet   Commonly known as:  TENORMIN   Take 50 mg by mouth 2 times daily                                atorvastatin 40 MG tablet   Commonly known as:  LIPITOR   Take 40 mg by mouth At Bedtime   Last time this was given:  40 mg on 6/2/2018  2:53 AM                                HYDROcodone-acetaminophen 5-325 MG per tablet   Commonly known as:  NORCO   Take 1-2 tablets by mouth 3 times daily as needed for severe pain                                isosorbide mononitrate 30 MG 24 hr tablet   Commonly known as:  IMDUR   Take 15 mg by mouth daily                                neomycin-polymyxin-dexamethasone 3.5-89325-8.1 Oint ophthalmic ointment   Commonly known as:  MAXITROL   Place 1 Application into the right eye 4 times daily On eyelid                                nitroGLYcerin 0.4 MG sublingual tablet   Commonly known as:  NITROSTAT   Place 0.4 mg under the tongue every 5 minutes as needed for chest pain For chest pain place 1 tablet under the tongue every 5 minutes for 3 doses. If symptoms persist 5 minutes after 1st dose call 911.                                ofloxacin 0.3 % ophthalmic solution   Commonly known as:  OCUFLOX   Place 1 drop into the right eye 4 times daily                                omeprazole 20 MG CR capsule   Commonly known as:  priLOSEC   Take 20 mg by mouth daily                                * oxyCODONE-acetaminophen 5-325 MG per tablet   Commonly known as:  PERCOCET   Take 1 tablet by mouth every 6 hours as needed for pain                                * oxyCODONE-acetaminophen 5-325 MG per tablet   Commonly known as:  PERCOCET   Take 1 tablet by mouth every 6 hours as needed for pain                                prednisoLONE acetate 1 % ophthalmic susp   Commonly known as:  PRED FORTE   Place 1 drop into the right eye 4 times  daily                                sulfaSALAzine 500 MG tablet   Commonly known as:  AZULFIDINE   Take 500 mg by mouth 4 times daily as needed (colitis)                                traMADol 50 MG tablet   Commonly known as:  ULTRAM   Take  mg by mouth every 4 hours as needed for pain or severe pain                                * Notice:  This list has 2 medication(s) that are the same as other medications prescribed for you. Read the directions carefully, and ask your doctor or other care provider to review them with you.

## 2018-06-02 VITALS
DIASTOLIC BLOOD PRESSURE: 71 MMHG | BODY MASS INDEX: 32.93 KG/M2 | RESPIRATION RATE: 14 BRPM | TEMPERATURE: 98.1 F | HEART RATE: 82 BPM | WEIGHT: 230 LBS | HEIGHT: 70 IN | SYSTOLIC BLOOD PRESSURE: 140 MMHG | OXYGEN SATURATION: 98 %

## 2018-06-02 LAB — GLUCOSE BLDC GLUCOMTR-MCNC: 160 MG/DL (ref 70–99)

## 2018-06-02 PROCEDURE — 25000128 H RX IP 250 OP 636: Performed by: ANESTHESIOLOGY

## 2018-06-02 PROCEDURE — G0378 HOSPITAL OBSERVATION PER HR: HCPCS

## 2018-06-02 PROCEDURE — A9270 NON-COVERED ITEM OR SERVICE: HCPCS | Mod: GY | Performed by: SURGERY

## 2018-06-02 PROCEDURE — 00000146 ZZHCL STATISTIC GLUCOSE BY METER IP

## 2018-06-02 PROCEDURE — 25000132 ZZH RX MED GY IP 250 OP 250 PS 637: Mod: GY | Performed by: SURGERY

## 2018-06-02 RX ORDER — TRAMADOL HYDROCHLORIDE 50 MG/1
50-100 TABLET ORAL EVERY 4 HOURS PRN
Status: DISCONTINUED | OUTPATIENT
Start: 2018-06-02 | End: 2018-06-02 | Stop reason: HOSPADM

## 2018-06-02 RX ORDER — ATORVASTATIN CALCIUM 40 MG/1
40 TABLET, FILM COATED ORAL AT BEDTIME
Status: DISCONTINUED | OUTPATIENT
Start: 2018-06-02 | End: 2018-06-02 | Stop reason: HOSPADM

## 2018-06-02 RX ORDER — HYDROCODONE BITARTRATE AND ACETAMINOPHEN 5; 325 MG/1; MG/1
1-2 TABLET ORAL EVERY 4 HOURS PRN
Status: DISCONTINUED | OUTPATIENT
Start: 2018-06-02 | End: 2018-06-02 | Stop reason: HOSPADM

## 2018-06-02 RX ORDER — ACYCLOVIR 400 MG/1
400 TABLET ORAL DAILY
Status: DISCONTINUED | OUTPATIENT
Start: 2018-06-02 | End: 2018-06-02

## 2018-06-02 RX ORDER — ACYCLOVIR 400 MG/1
400 TABLET ORAL DAILY
Status: DISCONTINUED | OUTPATIENT
Start: 2018-06-02 | End: 2018-06-02 | Stop reason: HOSPADM

## 2018-06-02 RX ORDER — ACETAMINOPHEN 650 MG/1
650 SUPPOSITORY RECTAL EVERY 4 HOURS PRN
Status: DISCONTINUED | OUTPATIENT
Start: 2018-06-02 | End: 2018-06-02 | Stop reason: HOSPADM

## 2018-06-02 RX ORDER — ONDANSETRON 2 MG/ML
4 INJECTION INTRAMUSCULAR; INTRAVENOUS EVERY 6 HOURS PRN
Status: DISCONTINUED | OUTPATIENT
Start: 2018-06-02 | End: 2018-06-02 | Stop reason: HOSPADM

## 2018-06-02 RX ORDER — ATENOLOL 25 MG/1
50 TABLET ORAL DAILY
Status: DISCONTINUED | OUTPATIENT
Start: 2018-06-02 | End: 2018-06-02 | Stop reason: HOSPADM

## 2018-06-02 RX ORDER — LIDOCAINE 40 MG/G
CREAM TOPICAL
Status: DISCONTINUED | OUTPATIENT
Start: 2018-06-02 | End: 2018-06-02 | Stop reason: HOSPADM

## 2018-06-02 RX ORDER — ACETAMINOPHEN 325 MG/1
650 TABLET ORAL EVERY 4 HOURS PRN
Status: DISCONTINUED | OUTPATIENT
Start: 2018-06-02 | End: 2018-06-02 | Stop reason: HOSPADM

## 2018-06-02 RX ORDER — ONDANSETRON 4 MG/1
4 TABLET, ORALLY DISINTEGRATING ORAL EVERY 6 HOURS PRN
Status: DISCONTINUED | OUTPATIENT
Start: 2018-06-02 | End: 2018-06-02 | Stop reason: HOSPADM

## 2018-06-02 RX ORDER — NALOXONE HYDROCHLORIDE 0.4 MG/ML
.1-.4 INJECTION, SOLUTION INTRAMUSCULAR; INTRAVENOUS; SUBCUTANEOUS
Status: DISCONTINUED | OUTPATIENT
Start: 2018-06-02 | End: 2018-06-02 | Stop reason: HOSPADM

## 2018-06-02 RX ORDER — IBUPROFEN 600 MG/1
600 TABLET, FILM COATED ORAL EVERY 6 HOURS SCHEDULED
Status: DISCONTINUED | OUTPATIENT
Start: 2018-06-02 | End: 2018-06-02 | Stop reason: HOSPADM

## 2018-06-02 RX ADMIN — FENTANYL CITRATE 25 MCG: 50 INJECTION INTRAMUSCULAR; INTRAVENOUS at 00:19

## 2018-06-02 RX ADMIN — FENTANYL CITRATE 50 MCG: 50 INJECTION INTRAMUSCULAR; INTRAVENOUS at 00:04

## 2018-06-02 RX ADMIN — Medication 0.2 MG: at 00:43

## 2018-06-02 RX ADMIN — ATORVASTATIN CALCIUM 40 MG: 40 TABLET ORAL at 02:53

## 2018-06-02 RX ADMIN — FENTANYL CITRATE 25 MCG: 50 INJECTION INTRAMUSCULAR; INTRAVENOUS at 00:35
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
## 2018-06-02 NOTE — PLAN OF CARE
Problem: Patient Care Overview  Goal: Plan of Care/Patient Progress Review  Outcome: Adequate for Discharge Date Met: 06/02/18  VSS. Afebrile. Eye shield in place. Noted swelling and bruising to right eye. Pt refused any medication from RN. Stated he wanted to get home and take his own medications to stay on his schedule. Discharge orders reviewed with patient and son. Verbalized understanding. PIV removed. Catheter intact. Transport took patient to pharmacy and front door. All belongings left with patient.

## 2018-06-02 NOTE — H&P
[facility], [place]    History and Physical : Trauma Service     Date of Admission:  6/1/2018    Time of Admission/Consult Request (page/call): 1900    Time of my evaluation: 1920  Consulting services:  Opthamology    Assessment & Plan   Trauma mechanism: fall off tractor  Time/date of injury: 11 a.m. 6/1/18  Known Injuries:  1. R inferior orbital wall blowout fracture with muscle entrapment  2. R corneal transplant graft avulsion  3. R eyelid laceration  4. R conjunctival hemorrhage  Other diagnoses:   1. 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 Hypertension  3. Hyperlipidemia  4. Coronary artery disease s/p PCI with stent in 2012 - on aspirin  5. Obesity (BMI 35)    Procedure:  - to OR with opthamology    Plan:  1. Admit to trauma, observation  2. To OR with opthamology now for repair of corneal avulsion / eyelid laceration  3. Management of inferior orbital fracture per opthamology  4. Resume home medications, prn norco, ultram, etodolac for pain control  5. Diet as tolerated post-op  6. Activity as tolerated with restrictions per opthamology  7. Encourage IS    Code status: full confirmed with patient.     General Cares:  GI Prophylaxis: continue home PPI  DVT Prophylaxis: hold tonight  Date of last stool/Bowel Regimen: senokot bid  Pulmonary toilet: IS, deep breathing exercises    ETOH: This patient was asked if in the last 3-6 months there has been a time when he had  5 or more drinks in a single day/outing.. Patient answer to the screening question was in the negative. No intervention needed.  Primary Care Physician   Clarissa Tai    Chief Complaint   Eye laceration    History is obtained from the patient    History of Present Illness   Scott Knott is a 86 year old male with history of HTN, HLD, CAD s/p PCI/stenting in 2012 (on asa), PE (off anticoagulation for > 1 year), chronic pain due to degenerative joint disease, obesity (BMI 35), now  "presents for management of right eye trauma.  He was at his home in [facility] this a.m. Mowing his lawn with a riding lawnmower up a hill when the mower flipped over.  The tractor hit him in the right eye, no LOC.  He was ambulatory at the scene and taken to local ER for evaluation where he was GCS 15 with obvious R eye trauma and irregular R pupil.   CT head, C-spine, and CT C/A/P were obtained and negative for injury.  CT maxillo-facial identified a R orbital wall blowout fx with muscle entrapment.  He was evaluated by an opthamologist who identified an avulsed R corneal graft and eyelid laceration and transport was arranged to [facility] for operative repair by opthamology.  He currently denies headache/nasuea/vomiting, no chest pain/dyspnea, no neck or back pain, no focal weakness/numbness.    Past Medical History    I have reviewed this patient's medical history and updated it with pertinent information if needed.   Past Medical History:   Diagnosis Date     Arthritis      Choroidal nevus     LE     Dermatochalasis of eyelid      ERM OS (epiretinal membrane, left eye)     Dr. Francisco Javier Jackman follows     HSV epithelial keratitis      Hypertension      Retinal detachment      Steroid responder, bilateral        Past Surgical History   I have reviewed this patient's surgical history and updated it with pertinent information if needed.  Past Surgical History:   Procedure Laterality Date     CATARACT IOL, RT/LT Right      CATARACT IOL, RT/LT  10/23/2013    Dr Kojo BLAKE     EYE SURGERY  6/2000    \"Triple\" KPE/PCL w/PK RE, Dr. Lozano     EYE SURGERY  8/20/03    \"RI\" right eye     EYE SURGERY      h/o AK w/compress suture RE     RETINAL REATTACHMENT Left 11/2014     STENT  04/2012     YAG CAPSULOTOMY OD (RIGHT EYE)       Prior to Admission Medications   Prior to Admission Medications   Prescriptions Last Dose Informant Patient Reported? Taking?   ACYCLOVIR PO   Yes No   Sig: Take 400 mg by mouth daily   ASPIRIN PO   " Yes No   Sig: Take 81 mg by mouth 2 times daily   ATENOLOL PO   Yes No   Si mg 1/2 tablet 2 times per day   ATORVASTATIN CALCIUM PO   Yes No   ETODOLAC PO   Yes No   Si mg 2 times per day with meals   HYDROCHLOROTHIAZIDE PO   Yes No   Sig: Take by mouth daily 25 mg 1/2 tablet   ISOSORBIDE MONONITRATE PO   Yes No   Sig: Take by mouth daily 1/2  Tablet    30 mg   NITROGLYCERIN SL   Yes No   Sig: Place 0.4 mg under the tongue as needed   OMEPRAZOLE PO   Yes No   TRAMADOL HCL PO   Yes No   amLODIPine-atorvastatin (CADUET) 5-80 MG per tablet   Yes No   Sig: Take 1 tablet by mouth At Bedtime   fluorometholone (FML LIQUIFILM) 0.1 % ophthalmic susp   No No   Sig: Place 1 drop Into the left eye 4 times daily Instill one drop in the right eye qd.   nitroglycerin (NITRODUR) 0.4 MG/HR   Yes No   Sig: Place 1 patch onto the skin daily      Facility-Administered Medications: None     Allergies   Allergies   Allergen Reactions     Contrast Dye      Tetracycline        Social History   Social History     Social History     Marital status:      Spouse name: N/A     Number of children: N/A     Years of education: N/A     Occupational History     Not on file.     Social History Main Topics     Smoking status: Former Smoker     Quit date: 1968     Smokeless tobacco: Former User     Alcohol use No     Drug use: Not on file     Sexual activity: Not on file     Other Topics Concern     Not on file     Social History Narrative       Family History   I have reviewed this patient's family history and updated it with pertinent information if needed.   Family History   Problem Relation Age of Onset     CANCER Mother      Hypertension Mother      CEREBROVASCULAR DISEASE Mother      Eye Surgery Mother      Amblyopia Son      Strabismus Son      Eye Surgery Son      Glasses (<9 y/o) Son      CANCER Paternal Aunt        Review of Systems   CONSTITUTIONAL: No fever, chills, sweats, fatigue   EYES: no visual blurring, no  double vision or visual loss  ENT: no decrease in hearing, no tinnitus, no vertigo, no hoarseness  RESPIRATORY: no shortness of breath, no cough, no sputum   CARDIOVASCULAR: no palpitations, no chest  pain, no exertional chest pain or pressure  GASTROINTESTINAL: no nausea or vomiting, or abd pain  GENITOURINARY: no dysuria, no frequency or hesitancy, no hematuria  MUSCULOSKELETAL: no weakness, no redness, no swelling, no joint pain,   SKIN: no rashes, ecchymoses, abrasions or lacerations  NEUROLOGIC: no numbness or tingling of hands, no numbness or tingling  of feet, no syncope, no tremors or weakness  PSYCHIATRIC: no sleep disturbances, no anxiety or depression    Physical Exam   Temp: 98.5  F (36.9  C) Temp src: Oral BP: 132/81 Pulse: 82   Resp: 14 SpO2: 95 % O2 Device: None (Room air)    Vital Signs with Ranges  Temp:  [98.5  F (36.9  C)] 98.5  F (36.9  C)  Pulse:  [82] 82  Resp:  [14] 14  BP: (132)/(81) 132/81  SpO2:  [95 %] 95 % 230 lbs 0 oz    Primary Survey:  Airway: patient talking  Breathing: symmetric respiratory effort bilaterally  Circulation: central pulses present and peripheral pulses present  Disability: Pupils - left 4 mm and brisk, right 4 mm and brisk     [place] Coma Scale - Total 15/15  Eye Response (E): 4  4= spontaneous,  3= to verbal/voice, 2=  to pain, 1= No response   Verbal Response (V): 5   5= Orientated, converses,  4= Confused, converses, 3= Inappropriate words,  2= Incomprehensible sounds,  1=No response   Motor Response (M): 6   6= Obeys commands, 5= Localizes to pain, 4= Withdrawal to pain, 3=Fexion to pain, 2= Extension to pain, 1= No response    Secondary Survey:  General: alert, oriented to person, place, time  Head: atraumatic, normocephalic, trachea midline  Eyes: R periorbital echymosis, L eye externally is wnl  Ears: non-inflamed external ear canals  Nose: nares patent, no drainage, nasal septum non-tender  Mouth/Throat: no exudates or erythema,  no dental tenderness or  malocclusions  Neck:  no cervical collar present. No midline posterior tenderness, full AROM without pain or tenderness   Chest/Pulmonary: normal respiratory rate and rhythm, on room air, no chest wall tenderness or deformities,   Cardiovascular: regular rate and rhythm  Abdomen: soft, non-tender, no guarding, no rebound tenderness and no tenderness to palpation  : pelvis stable to lateral compression, no segal, no urine assess  Back/Spine: no deformity, no midline tenderness, no sacral tenderness,  no step-offs and no abrasions or contusions  Musculoskel/Extremities: normal extremities, full AROM of major joints without tenderness, edema, erythema, ecchymosis, or abrasions. PP. no edema.   Hand: no gross deformities of hands or fingers. Full AROM of hand and fingers in flexion and extension.  strength equal and symmetric.   Skin: no rashes, laceration, ecchymosis, skin warm and dry.   Neuro: alert, oriented x 4. CN II-XII grossly intact. No focal deficits. Strength 5/5 x 4 extremities.  Sensation intact.  Psychiatric: affect/mood normal, cooperative, normal judgement/insight and memory intact  # Pain Assessment:   - Scott is experiencing pain due to R eye contusion. Pain management was discussed and the plan was created in a collaborative fashion.  Scott's response to the current recommendations: engaged  - Please see the plan for pain management as documented above    Data   UA RESULTS:  No results for input(s): COLOR, APPEARANCE, URINEGLC, URINEBILI, URINEKETONE, SG, UBLD, URINEPH, PROTEIN, UROBILINOGEN, NITRITE, LEUKEST, RBCU, WBCU in the last 67749 hours.   Results for orders placed or performed during the hospital encounter of 06/01/18 (from the past 24 hour(s))   EKG 12-lead, tracing only   Result Value Ref Range    Interpretation ECG Click View Image link to view waveform and result    CBC with platelets differential   Result Value Ref Range    WBC 11.1 (H) 4.0 - 11.0 10e9/L    RBC Count 4.38 (L)  4.4 - 5.9 10e12/L    Hemoglobin 13.7 13.3 - 17.7 g/dL    Hematocrit 40.8 40.0 - 53.0 %    MCV 93 78 - 100 fl    MCH 31.3 26.5 - 33.0 pg    MCHC 33.6 31.5 - 36.5 g/dL    RDW 12.9 10.0 - 15.0 %    Platelet Count 333 150 - 450 10e9/L    Diff Method Automated Method     % Neutrophils 71.7 %    % Lymphocytes 17.2 %    % Monocytes 9.6 %    % Eosinophils 0.9 %    % Basophils 0.3 %    % Immature Granulocytes 0.3 %    Nucleated RBCs 0 0 /100    Absolute Neutrophil 8.0 1.6 - 8.3 10e9/L    Absolute Lymphocytes 1.9 0.8 - 5.3 10e9/L    Absolute Monocytes 1.1 0.0 - 1.3 10e9/L    Absolute Eosinophils 0.1 0.0 - 0.7 10e9/L    Absolute Basophils 0.0 0.0 - 0.2 10e9/L    Abs Immature Granulocytes 0.0 0 - 0.4 10e9/L    Absolute Nucleated RBC 0.0        Studies:  No orders to display       Az Boykin

## 2018-06-02 NOTE — ANESTHESIA CARE TRANSFER NOTE
Patient: Scott Knott    Procedure(s):  Right Lobe and Right Upper Lid Laceration REPAIR RUPTURED GLOBE; Protected with AcuVue Oasis Contact Lens.  - Wound Class: I-Clean    Diagnosis: ruptured globe   Diagnosis Additional Information: No value filed.    Anesthesia Type:   General, ETT     Note:  Airway :Face Mask  Patient transferred to:PACU  Handoff Report: Identifed the Patient, Identified the Reponsible Provider, Reviewed the pertinent medical history, Discussed the surgical course, Reviewed Intra-OP anesthesia mangement and issues during anesthesia, Set expectations for post-procedure period and Allowed opportunity for questions and acknowledgement of understanding      Vitals: (Last set prior to Anesthesia Care Transfer)    CRNA VITALS  6/1/2018 2321 - 6/1/2018 2354      6/1/2018             Pulse: 90    SpO2: 96 %    Resp Rate (observed): (!)  1    Resp Rate (set): 10                Electronically Signed By: Ernst Burden MD  June 1, 2018  11:54 PM

## 2018-06-02 NOTE — ANESTHESIA PREPROCEDURE EVALUATION
Anesthesia Evaluation     .             ROS/MED HX    ENT/Pulmonary: Comment: multiple segmental and subsegmental pulmonary emboli in January 2014      Neurologic:       Cardiovascular: Comment: CAD status post CARLOS to proximal LAD in 2012    coronary angiogram on 03/26/2018 which showed diffuse moderate nonobstructive CAD of large dominant RCA and moderately severe CAD involving small branches in the apical LAD. Chose to pursue medical management. Continues to experience anginal symptoms weekly per PCP note    (+) Dyslipidemia, hypertension----. Taking blood thinners : . . . :. . Previous cardiac testing       METS/Exercise Tolerance:     Hematologic:         Musculoskeletal:         GI/Hepatic:         Renal/Genitourinary:     (+) chronic renal disease, type: CRI, Pt does not require dialysis,       Endo: Comment: glucose intolerance     (+) Obesity, .      Psychiatric:         Infectious Disease:         Malignancy:         Other:                                    Anesthesia Plan      History & Physical Review  History and physical reviewed and following examination; no interval change.    ASA Status:  3 .    NPO Status:  Waived due to emergency    Plan for General, ETT and RSI with Intravenous induction. Maintenance will be Balanced.    PONV prophylaxis:  Ondansetron (or other 5HT-3)       Postoperative Care  Postoperative pain management:  Multi-modal analgesia.      Consents  Anesthetic plan, risks, benefits and alternatives discussed with:  Patient..        Procedure: Procedure(s):  REPAIR RUPTURED GLOBE - Wound Class: I-Clean    HPI: Scott Knott is a 86 year old male with PMHx HTN, CAD, obesity who is undergoing above procedure for traumatic globe rupture.    PMHx/PSHx:  Past Medical History:   Diagnosis Date     Arthritis      Choroidal nevus     LE     Dermatochalasis of eyelid      ERM OS (epiretinal membrane, left eye)     Dr. Francisco Javier Jackman follows     HSV epithelial keratitis      Hypertension   "    Retinal detachment      Steroid responder, bilateral        Past Surgical History:   Procedure Laterality Date     CATARACT IOL, RT/LT Right      CATARACT IOL, RT/LT  10/23/2013    Dr Kojo BLAKE     EYE SURGERY  6/2000    \"Triple\" KPE/PCL w/PK RE, Dr. Lozano     EYE SURGERY  8/20/03    \"RI\" right eye     EYE SURGERY      h/o AK w/compress suture RE     RETINAL REATTACHMENT Left 11/2014     STENT  04/2012     YAG CAPSULOTOMY OD (RIGHT EYE)           No current facility-administered medications on file prior to encounter.   Current Outpatient Prescriptions on File Prior to Encounter:  ACYCLOVIR PO Take 400 mg by mouth daily   amLODIPine-atorvastatin (CADUET) 5-80 MG per tablet Take 1 tablet by mouth At Bedtime   ASPIRIN PO Take 81 mg by mouth 2 times daily   ATENOLOL  mg 1/2 tablet 2 times per day   ATORVASTATIN CALCIUM PO    ETODOLAC  mg 2 times per day with meals   fluorometholone (FML LIQUIFILM) 0.1 % ophthalmic susp Place 1 drop Into the left eye 4 times daily Instill one drop in the right eye qd.   HYDROCHLOROTHIAZIDE PO Take by mouth daily 25 mg 1/2 tablet   ISOSORBIDE MONONITRATE PO Take by mouth daily 1/2  Tablet    30 mg   nitroglycerin (NITRODUR) 0.4 MG/HR Place 1 patch onto the skin daily   NITROGLYCERIN SL Place 0.4 mg under the tongue as needed   OMEPRAZOLE PO    TRAMADOL HCL PO        Social Hx:   Social History   Substance Use Topics     Smoking status: Former Smoker     Quit date: 1/8/1968     Smokeless tobacco: Former User     Alcohol use No       Allergies:   Allergies   Allergen Reactions     Contrast Dye      Tetracycline          NPO Status: Per ASA Guidelines    Labs:    Blood Bank:  No results found for: ABO, RH, AS  BMP:  No results for input(s): NA, POTASSIUM, CHLORIDE, CO2, BUN, CR, GLC, KAILA in the last 75995 hours.  CBC:   No results for input(s): WBC, RBC, HGB, HCT, MCV, MCH, MCHC, RDW, PLT in the last 31448 hours.  Coags:  No results for input(s): INR, PTT, FIBR in the " last 30951 hours.

## 2018-06-02 NOTE — PHARMACY-ADMISSION MEDICATION HISTORY
Admission medication history interview status for the 6/1/2018 admission is complete. See Epic admission navigator for allergy information, pharmacy, prior to admission medications and immunization status.     Medication history interview sources:  [facility] ED Discharge Notes    Changes made to PTA medication list (reason)  Added: Norco 5-325 mg tablet   Sulfasalazine 500 mg tablet  Deleted: None  Changed: Aspirin 81 mg tablet - 2 tablets by mouth daily (not 1 tablet by mouth twice daily)       Fluorometholone opthalmic suspension - Place 1 drop into both eyes at bedtime (not 1 drop into left eye four times daily and 1 drop into right eye at bedtime)    Additional medication history information (including reliability of information, actions taken by pharmacist):    Medications were verified via the [facility] discharge list. Last doses could not be confirmed as patient is currently in surgery.    Allergies and flu shot status unable to be confirmed (not in [facility]).    Home pharmacy: [facility] in [place] per PTA list    Prior to Admission medications    Medication Sig Last Dose Taking? Auth Provider   acyclovir (ZOVIRAX) 400 MG tablet Take 400 mg by mouth daily Unknown Yes Unknown, Entered By History   amLODIPine (NORVASC) 5 MG tablet Take 7.5 mg by mouth daily Unknown Yes Unknown, Entered By History   aspirin 81 MG tablet Take 162 mg by mouth daily Unknown Yes Unknown, Entered By History   atenolol (TENORMIN) 50 MG tablet Take 50 mg by mouth 2 times daily  Unknown Yes Unknown, Entered By History   atorvastatin (LIPITOR) 40 MG tablet Take 40 mg by mouth At Bedtime Unknown Yes Unknown, Entered By History   etodolac (LODINE) 400 MG tablet Take 400 mg by mouth 2 times daily With meals Unknown Yes Unknown, Entered By History   fluorometholone (FML LIQUIFILM) 0.1 % ophthalmic susp Place 1 drop into both eyes At Bedtime Unknown Yes Unknown, Entered By History   HYDROcodone-acetaminophen (NORCO)  5-325 MG per tablet Take 1-2 tablets by mouth 3 times daily as needed for severe pain Unknown Yes Unknown, Entered By History   isosorbide mononitrate (IMDUR) 30 MG 24 hr tablet Take 15 mg by mouth daily Unknown Yes Unknown, Entered By History   nitroGLYcerin (NITROSTAT) 0.4 MG sublingual tablet Place 0.4 mg under the tongue every 5 minutes as needed for chest pain For chest pain place 1 tablet under the tongue every 5 minutes for 3 doses. If symptoms persist 5 minutes after 1st dose call 911. Unknown Yes Unknown, Entered By History   omeprazole (PRILOSEC) 20 MG CR capsule Take 20 mg by mouth daily Unknown Yes Unknown, Entered By History   sulfaSALAzine (AZULFIDINE) 500 MG tablet Take 500 mg by mouth 4 times daily as needed (colitis) Unknown Yes Unknown, Entered By History   traMADol (ULTRAM) 50 MG tablet Take  mg by mouth every 4 hours as needed for pain or severe pain Unknown Yes Unknown, Entered By History         Medication history completed by: Jing Jaramillo, 3rd Year Student Pharmacist

## 2018-06-02 NOTE — OR NURSING
RN received report from ER Triage RN at this time, Dr. Delgado paged for preop orders/consent. ER unable to send for patient at this time, OR desk notified and they will send for patient with eye cart.

## 2018-06-02 NOTE — ANESTHESIA POSTPROCEDURE EVALUATION
Patient: Scott Knott    Procedure(s):  Right Lobe and Right Upper Lid Laceration REPAIR RUPTURED GLOBE; Protected with AcuVue Oasis Contact Lens.  - Wound Class: I-Clean    Diagnosis:ruptured globe   Diagnosis Additional Information: No value filed.    Anesthesia Type:  General, ETT    Note:  Anesthesia Post Evaluation    Patient location during evaluation: PACU  Patient participation: Able to fully participate in evaluation  Level of consciousness: awake and alert  Pain management: adequate  Airway patency: patent  Cardiovascular status: acceptable  Respiratory status: acceptable  Hydration status: acceptable  PONV: none     Anesthetic complications: None          Last vitals:  Vitals:    06/02/18 0215 06/02/18 0230 06/02/18 0732   BP: 146/66 136/64 140/71   Pulse:   82   Resp:   14   Temp:   36.7  C (98.1  F)   SpO2:  97% 98%         Electronically Signed By: Cristela Snyder MD  June 2, 2018  8:47 AM

## 2018-06-02 NOTE — PROGRESS NOTES
CC: Postoperative day#1 s/p globe rupture repair and RIGHT UPPER EYELID laceration repair OD    HPI: Scott Knott is a 86 year old male who has past medical history HTN, HLD, CAD s/p PCI/stent (on aspirin), PE, chronic pain, obesity who is transferred for right globe rupture from OSH. He was riding a lawn tractor up a hill today when it overturned and he fell on the right side of his head. He had loss of vision right eye, was able to ambulate back to his house to ask for help. He placed ice over He denies loss of consciousness, and had imaging performed at OSH including head, cervical spine, and thorax, that showed right orbital floor fracture. He is NPO since 3pm, last oral intake was small crackers with 1/2 cup water.         Review of systems were otherwise negative except for that which has been stated above.    Interval: Patient was inpatient overnight. Slept well. Denies nausea or vomiting or headache.    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     Intraocular Pressure: RE  27 (with bandage contact lens+) ; LE  14     Motility: RIGHT eye decreased elevation, left: full        External/Slit Lamp Exam   RIGHT EYE                         Lids/Lashes: right upper eyelid laceration inferior to eyebrow with sutures in place, periorbital edema and ecchymosis OD                         Conj/Sclera: Temporal Subconjunctival hemorrhage, 2+ injection                         Cornea: corneal sutures in place temporally, glue and bandage contact lens+, MCE and 2+ Diabetes mellitus folds                         Ant Chamber: Deep, fibrin in pupillary area, difficult to evaluate cells                           Iris: flat                         Lens: posterior chamber intraocular lens (PCIOL) in place  LEFT                         Lids/lashes: Normal                         Conj/Sclera: White and Quiet, calcification nasal                         Cornea:  Clear                         Ant Chamber:  Deep and Quiet                         Iris: Round and Reactive                         Lens: PCIOL     Dilated Fundus Exam  LEFT:                         Anterior Vitreous: Clear                         Media: Clear                         Optic Nerve: Peripapillary atrophy                         Cup to Disc Ratio: 0.4                         Macula: Temporal and superior area of atrophy, temporal nevus, macula appears flat, scarring encroaching macula                         Vessels: Normal Caliber and Distribution            A/P:  Postoperative day#1 s/p globe rupture repair and RIGHT UPPER EYELID laceration repair OD       Difficult closure with persistent oozing after multiple sutures, s/p glue and bandage contact lens in OR  AC well formed today  MCE, K sutures intact, glue and bandage contact lens in place  Fibrin in pupillary area    Start   -Pred forte drops right eye Q2hr  (ordered): watch for IOP, may taper to four times a day  On Monday  -Ofloxacin drops right eye four times a day  (ordered)  -maxitrol ointment right eyelid four times a day  (ordered)  - preservative free eye drops four times a day  OD (ordered)  -start acyclovir 500 mg orally daily (ordered)    -Percocet ordered for pain control outpatient (script in chart)      -Shield at all times  -do not bend over  -do not lift more than a gallon of weight  -avoid dirty water in eye    Infero-nasal retinal traction on B scan OD  No vitreous hemorrhage  No view to retina  F/u with retina on monday    Inferior orbital wall fracture with inferior rectus entrapment OD   Orbital wall fracture was discussed with on call oculoplastics fellow,   John Paul. 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 Bright  Vision at baseline in OS  F/u with retina on Monday    -Schedule follow up with Oculoplastic and retina on Monday     -Appreciate trauma surgery assistance for overnight observation    Complete documentation of historical and exam elements from today's encounter can be found in the full encounter summary report (not reduplicated in this progress note). I personally obtained the chief complaint(s) and history of present illness.  I confirmed and edited as necessary the review of systems, past medical/surgical history, family history, social history, and examination findings as documented by others.  I examined the patient myself, and I personally reviewed the relevant tests, images, and reports as documented above. I formulated and edited as necessary the assessment and plan and discussed the findings and management plan with the patient and family.        DOLLY Abreu  Cornea fellow

## 2018-06-02 NOTE — PLAN OF CARE
Problem: Patient Care Overview  Goal: Plan of Care/Patient Progress Review  Outcome: Improving   Admitted approximately 0100 from PACU for right global eye rupture repair, upper eye laceration repair. Right eye covered with eye shield and gauze.  Neuros intact. VSS on 1L NC. Capnography with IPI 10. Pain is managed with PO medication tyelnol, has norco and ultram available. Tolerating water with medication. Up SBA. Voids without difficulty. Regular diet. Plan to discharge home today 6/2 after MD rounds. Continue to monitor.

## 2018-06-02 NOTE — DISCHARGE SUMMARY
[facility], [place]    Discharge Summary  Trauma Surgery Service    Date of Admission:  6/1/2018  Date of Discharge:  6/2/2018  Discharging Provider: Yin Dobson CNP  Date of Service (when I saw the patient): 06/02/18    Primary Provider: Clarissa Tai  Primary Care clinic: [place] [address] [place] [address]  Phone: [phone]  Fax number: [phone]     Discharge Diagnoses      Orbital floor (blow-out) closed fracture (H)  History of penetrating keratoplasty    Hospital Course   History of Present Illness: Scott Knott is a 86 year old male with history of HTN, HLD, CAD s/p PCI/stenting in 2012 (on asa), PE (off anticoagulation for > 1 year), chronic pain due to degenerative joint disease, obesity (BMI 35), now presents for management of right eye trauma.  He was at his home in [facility] this a.m. Mowing his lawn with a riding lawnmower up a hill when the mower flipped over.  The tractor hit him in the right eye, no LOC.  He was ambulatory at the scene and taken to local ER for evaluation where he was GCS 15 with obvious R eye trauma and irregular R pupil.   CT head, C-spine, and CT C/A/P were obtained and negative for injury.  CT maxillo-facial identified a R orbital wall blowout fx with muscle entrapment.  He was evaluated by an opthamologist who identified an avulsed R corneal graft and eyelid laceration and transport was arranged to [facility] for operative repair by opthamology.  He currently denies headache/nasuea/vomiting, no chest pain/dyspnea, no neck or back pain, no focal weakness/numbness. He underwent an exploratory eye exam and globe repair in the operating room by Dr. Delgado. Please see blelow.    Fall off tractor  The patient sustained the above injury as a result of fall off his tractor. The patient underwent tertiary examination to evaluate for additional injuries.  The systematic review did not find any other injuries.    Orbital floor  blowout and right corneal transplant graft avulsion right eyelid laceration:  Mr Knott sustained this injury as a result of a fall. He underwent a right globe and right Upper Lid Laceration repair by Dr. Delgado on 06/01/18. Please refer to his operative note for details. He was seen at the Opthalmology clinic today 06/02 and is recommended to follow up on Monday with the following recommendations:  Start   -Pred forte drops right eye Q2hr  (ordered): watch for IOP, may taper to four times a day  On Monday  -Ofloxacin drops right eye four times a day  (ordered)  -maxitrol ointment right eyelid four times a day  (ordered)  - preservative free eye drops four times a day  OD (ordered)  -start acyclovir 500 mg orally daily (ordered)     -Percocet ordered for pain control outpatient       -Shield at all times  -do not bend over  -do not lift more than a gallon of weight  -avoid dirty water in eye    Acute pain  Pain was controlled with a multi-modality approach.  The current regimen for Scott Knott includes the following, PRN acetaminophen and PRN short acting opioids.  Adequate pain control was achieved with this regimen.  We anticipate that they will taper off this regimen over the next several weeks.  Acute pain  # Discharge Pain Plan:   - During his hospitalization, Scott experienced pain due to eye trauma.  The pain plan for discharge was discussed with Scott and the plan was created in a collaborative fashion.    - Chronic/continued opioids: Percocet and tramadol     Adequate pain control was achieved with this regimen.  We anticipate that they will taper off this regimen over the next several weeks..  Significant Results and Procedures   DATE: 06/01/18  Procedure(s):  Right Lobe and Right Upper Lid Laceration REPAIR RUPTURED GLOBE; Protected with AcuVue Oasis Contact Lens.  - Wound Class: I-Clean  Surgeon(s): Surgeon(s) and Role:     * Lisette Delgado MD - Primary     * Yo Snyder MD - Resident  "- Assisting    Non-operative procedures None performed    Code Status   Full Code    Discharge Disposition   Discharged to home  Condition at discharge: Stable  Discharge VS: Blood pressure 140/71, pulse 82, temperature 98.1  F (36.7  C), temperature source Oral, resp. rate 14, height 1.778 m (5' 10\"), weight 104.3 kg (230 lb), SpO2 98 %.    Consultations This Hospital Stay   MEDICATION HISTORY IP PHARMACY CONSULT    Discharge Orders     Reason for your hospital stay   1. Penetrating keratoplasty dehiscence with uveal prolapse, right eye  2. Eyelid laceration, right upper eyelid        Procedure performed:06/01  1. Repair of ruptured globe, right eye  2. Repair of non-margin involving right upper lid laceration     Adult [facility] Follow-up and recommended labs and tests   Oral and Maxillofacial Surgeons: Orbital wall fracture was discussed with on call oculoplastics fellow, Dr. Coker.  [address] [facility]  [address] [place], [place] [id number]  Appointments: [phone]    Ophthalmology (Eye) Clinic:  Follow up with plastic on MondaySchedule follow up with Oculoplastic and retina on Monday  [address]oor [address]   [address] [place], [place] [address]   Appointments: [phone]     Appointments on [place] and/or [facility] (with [facility] or [facility] provider or service). Call [phone] if you haven't heard regarding these appointments within 7 days of discharge.     Activity   Your activity upon discharge: activity as tolerated  -Shield at all times  -do not bend over  -do not lift more than a gallon of weight  -avoid dirty water in eye     Wound care and dressings   Instructions to care for your wound at home: as directed.     Full Code     Diet   Follow this diet upon discharge: Regular       Discharge Medications   Current Discharge Medication List      START taking these medications    Details   neomycin-polymyxin-dexamethasone (MAXITROL) 3.5-54216-2.1 OINT ophthalmic ointment Place 1 Application into the " right eye 4 times daily On eyelid  Qty: 1 Tube, Refills: 0    Associated Diagnoses: Surgical aftercare, sense organs      ofloxacin (OCUFLOX) 0.3 % ophthalmic solution Place 1 drop into the right eye 4 times daily  Qty: 1 Bottle, Refills: 11    Associated Diagnoses: Surgical aftercare, sense organs      !! oxyCODONE-acetaminophen (PERCOCET) 5-325 MG per tablet Take 1 tablet by mouth every 6 hours as needed for pain  Qty: 12 tablet, Refills: 0    Associated Diagnoses: Surgical aftercare, sense organs      !! oxyCODONE-acetaminophen (PERCOCET) 5-325 MG per tablet Take 1 tablet by mouth every 6 hours as needed for pain  Qty: 20 tablet, Refills: 0    Associated Diagnoses: Surgical aftercare, sense organs; History of penetrating keratoplasty      prednisoLONE acetate (PRED FORTE) 1 % ophthalmic susp Place 1 drop into the right eye 4 times daily  Qty: 1 Bottle, Refills: 0    Associated Diagnoses: Surgical aftercare, sense organs       !! - Potential duplicate medications found. Please discuss with provider.      CONTINUE these medications which have NOT CHANGED    Details   acyclovir (ZOVIRAX) 400 MG tablet Take 400 mg by mouth daily      amLODIPine (NORVASC) 5 MG tablet Take 7.5 mg by mouth daily      atenolol (TENORMIN) 50 MG tablet Take 50 mg by mouth 2 times daily       atorvastatin (LIPITOR) 40 MG tablet Take 40 mg by mouth At Bedtime      HYDROcodone-acetaminophen (NORCO) 5-325 MG per tablet Take 1-2 tablets by mouth 3 times daily as needed for severe pain      isosorbide mononitrate (IMDUR) 30 MG 24 hr tablet Take 15 mg by mouth daily      nitroGLYcerin (NITROSTAT) 0.4 MG sublingual tablet Place 0.4 mg under the tongue every 5 minutes as needed for chest pain For chest pain place 1 tablet under the tongue every 5 minutes for 3 doses. If symptoms persist 5 minutes after 1st dose call 911.      omeprazole (PRILOSEC) 20 MG CR capsule Take 20 mg by mouth daily      sulfaSALAzine (AZULFIDINE) 500 MG tablet Take 500 mg  by mouth 4 times daily as needed (colitis)      traMADol (ULTRAM) 50 MG tablet Take  mg by mouth every 4 hours as needed for pain or severe pain         STOP taking these medications       aspirin 81 MG tablet Comments:   Reason for Stopping:         etodolac (LODINE) 400 MG tablet Comments:   Reason for Stopping:         fluorometholone (FML LIQUIFILM) 0.1 % ophthalmic susp Comments:   Reason for Stopping:             Allergies   Allergies   Allergen Reactions     Contrast Dye      Tetracycline      Data   Most Recent 3 CBC's:  Recent Labs   Lab Test  06/01/18   1908   WBC  11.1*   HGB  13.7   MCV  93   PLT  333      Most Recent 3 BMP's:  Recent Labs   Lab Test  06/01/18 1908   NA  138   POTASSIUM  4.1   CHLORIDE  101   CO2  29   BUN  16   CR  0.88   ANIONGAP  8   KAILA  9.1   GLC  149*     Most Recent 2 LFT's:No lab results found.  Most Recent INR's and Anticoagulation Dosing History:  Anticoagulation Dose History     Recent Dosing and Labs Latest Ref Rng & Units 6/1/2018    INR 0.86 - 1.14 1.05        Most Recent 3 Troponin's:No lab results found.  Most Recent 6 Bacteria Isolates From Any Culture (See EPIC Reports for Culture Details):No lab results found.  Most Recent TSH, T4 and A1c Labs:No lab results found.  No results found for this or any previous visit.    Time Spent on this Encounter   I, Yin Dobson, personally saw the patient today and spent less than or equal to 30 minutes discharging this patient.    We appreciate the opportunity to care for your patient while in the hospital.  Should you have any questions about their injuries or this discharge summary our contact information is below.    Trauma Services  [facility]   Department of Critical Care and Acute Care Surgery  [address]  [place] [address]  Office: [phone]

## 2018-06-02 NOTE — OP NOTE
Attending: Lisette ALBERTO      Resident: Yo Snyder MD      Pre-operative diagnosis:  1. Penetrating keratoplasty dehiscence with uveal prolapse, right eye  2. Eyelid laceration, right upper eyelid      Post-operative diagnosis: Same      Procedure performed:  1. Repair of ruptured globe, right eye  2. Repair of non-margin involving right upper lid laceration  3. Examination under anesthesia      Anesthesia: General      Estimated blood loss: Less than 1 mL      Indication for procedure: The patient is a 86 year old male  who was transferred from an outside hospital for ruptured globe on 06/01/18 . Examination revealed a temporal graft host junction dehiscence of right eye with uveal tissue prolapse and right upper eyelid laceration. CT orbits revealed  no radio opaque intraocular foreign body. The risks, benefits, alternatives, and considerations of surgery including infection, inflammation, bleeding, high or low eye pressure, loss of vision, loss of eye, and need for additional procedures were reviewed, and the mother elected to proceed with surgery.      Description of procedure: The patient was met in the pre-operative holding area where the history, physical, and consent forms were reviewed and verified. The left eye was marked as the correct operative site. The patient was wheeled back to the operating room, and placed under general anesthesia. The left eye was prepped and draped. A Mahesh lid speculum was inserted into the left upper and lower eyelids for adequate exposure of the globe. The microscope was swung into position, and a timeout was completed.       A temporal graft host junction (GHJ) dehiscence from 6:30 to 12:0 o'clock with iris prolapse was visualized. There was no evidence of vitreous prolapse. The iris tissue was gently re-posited using a blunt cannula. The GHJ was re-approximated with a 10-0 nylon suture. 15 interrupted 10-0 nylon sutures were placed to re-approximate the wound.  Persistent slow leak was identified, in spite to repeating multiple sutures and placing more number of sutures than done routinely. Dermabond corneal glue was applied after completely drying the GHJ with weckcell. The wound appeared dry and chamber was well formed. bandage contact lens was placed.      Subconjunctival dexamethasone (10 mg/mL),  ceftazadime (2.25 mg/0.1 mL) was injected.      The lid speculum was removed.      The surgical drapes were removed. Maxitrol eyedrop was placed in the eye.      Attention was turned to the right upper eyelid laceration. A 1 cm laceration was identified near mid-right eyebrow. Deep muscle was sutured with interrupted 6-0 vicryl suture. The skin was closed with interrupted 6-0 plain gut suture. There was a 0.5 cm laceration supero-medial to first laceration, which was closed in a similar manner.      The left eye was dilated with tropicamide. Examination revealed clear cornea, white conjunctiva, deep and quiet anterior chamber, dilated and round iris, clear lens. fundus exam revealed normal disc, there were large atrophic elevated areas superiorly and temporally with possible traction and extending to macula , clear vitreous     The patient was patched and shielded.       The patient was then brought out of general anesthesia and taken to the postoperative recovery unit in stable condition with follow up next day and followup to see the retina specialist next week.      Dr. Delgado  was scrubbed and present for the entire case.

## 2018-06-02 NOTE — ED PROVIDER NOTES
History     Chief Complaint   Patient presents with     Eye Injury     rt     HPI  Scott Knott is a 86 year old male who was mowing his yard today with his lawn tractor and states he went up an incline that he normally does not mow.  Patient states he is not sure why he went up the incline but states the next thing he knew the mower was coming over on top of him.  The front of the mower lifted up and went right over the top of him with the blancas hitting him in the face as they went down to the ground.  The patient states that his eyeglasses stuck into his right brow and that he had some trauma to the right side of his face.  Patient states he did not get knocked out but he did have to pull the stem of his glasses out of the brow.  The patient states that he was taken to [facility] where he was seen in the ER and was evaluated with CT with his head, neck, chest, abdomen and pelvis and this revealed no abnormalities except for a blowout fracture in the right side with muscle entrapment.  Further evaluation by Ophthalmology at [place] revealed a possible retinal tear with his previous history of retinal detachment and repair.  Patient was referred to the [facility] here for Ophthalmology and surgical intervention.  Patient states during the episode he did not lose consciousness and he denies any shortness of breath, chest pain, neck pain, focal numbness or weakness.  Patient denies any vomiting or diarrhea.  Patient's last tetanus immunization was given in the ER in [place].  Patient states his only blood thinner is aspirin.  Laboratory results through Care Everywhere reveal patient's creatinine to be 0.98 with a glucose of 153.  The patient's INR was not reported, and meanwhile his hemoglobin was 14.4 with a platelet count of 343.        This part of the document was transcribed by Neha Hennessy Medical Scribe.   I have reviewed the Medications, Allergies, Past Medical and Surgical  "History, and Social History in the [employer] system.  Past Medical History:   Diagnosis Date     Arthritis      Choroidal nevus     LE     Dermatochalasis of eyelid      ERM OS (epiretinal membrane, left eye)     Dr. Francisco Javier Jackman follows     HSV epithelial keratitis      Hypertension      Retinal detachment      Steroid responder, bilateral        Past Surgical History:   Procedure Laterality Date     CATARACT IOL, RT/LT Right      CATARACT IOL, RT/LT  10/23/2013    Dr Kojo BLAKE     EYE SURGERY  6/2000    \"Triple\" KPE/PCL w/PK RE, Dr. Lozano     EYE SURGERY  8/20/03    \"RI\" right eye     EYE SURGERY      h/o AK w/compress suture RE     RETINAL REATTACHMENT Left 11/2014     STENT  04/2012     YAG CAPSULOTOMY OD (RIGHT EYE)         Family History   Problem Relation Age of Onset     CANCER Mother      Hypertension Mother      CEREBROVASCULAR DISEASE Mother      Eye Surgery Mother      Amblyopia Son      Strabismus Son      Eye Surgery Son      Glasses (<9 y/o) Son      CANCER Paternal Aunt        Social History   Substance Use Topics     Smoking status: Former Smoker     Quit date: 1/8/1968     Smokeless tobacco: Former User     Alcohol use No     Previous Medications    ACYCLOVIR PO    Take 400 mg by mouth daily    AMLODIPINE-ATORVASTATIN (CADUET) 5-80 MG PER TABLET    Take 1 tablet by mouth At Bedtime    ASPIRIN PO    Take 81 mg by mouth 2 times daily    ATENOLOL PO    100 mg 1/2 tablet 2 times per day    ATORVASTATIN CALCIUM PO        ETODOLAC PO    400 mg 2 times per day with meals    FLUOROMETHOLONE (FML LIQUIFILM) 0.1 % OPHTHALMIC SUSP    Place 1 drop Into the left eye 4 times daily Instill one drop in the right eye qd.    HYDROCHLOROTHIAZIDE PO    Take by mouth daily 25 mg 1/2 tablet    ISOSORBIDE MONONITRATE PO    Take by mouth daily 1/2  Tablet    30 mg    NITROGLYCERIN (NITRODUR) 0.4 MG/HR    Place 1 patch onto the skin daily    NITROGLYCERIN SL    Place 0.4 mg under the tongue as needed    OMEPRAZOLE PO   " "     TRAMADOL HCL PO            Allergies   Allergen Reactions     Contrast Dye      Tetracycline        Review of Systems   Eyes:        See HPI   Respiratory: Negative for shortness of breath.    Cardiovascular: Negative for chest pain.   Gastrointestinal: Negative for diarrhea and vomiting.   Musculoskeletal: Negative for neck pain.   Skin:        See HPI   Neurological: Negative for syncope, weakness and numbness.   All other systems reviewed and are negative.      Physical Exam   BP: 132/81  Pulse: 82  Temp: 98.5  F (36.9  C)  Resp: 14  Height: 177.8 cm (5' 10\")  Weight: 104.3 kg (230 lb)  SpO2: 95 %      Physical Exam   Constitutional: He is oriented to person, place, and time.   Alert conversant pleasant with obvious right periorbital trauma   HENT:   Patient has a right periorbital trauma with ecchymoses   Eyes:   Left eye examined and within normal limits   Neck: Neck supple.   Nontender posteriorly   Cardiovascular: Regular rhythm.    Pulmonary/Chest: Breath sounds normal. He exhibits no tenderness.   Abdominal: Soft. There is no tenderness.   Musculoskeletal: He exhibits no edema, tenderness or deformity.   Neurological: He is alert and oriented to person, place, and time.   Grossly intact with strength bilaterally   Skin:   Ecchymoses right periorbital area   Psychiatric: He has a normal mood and affect.       ED Course     ED Course     Procedures          He was being seen by ophthalmology in the ER upon my arrival to the patient's room.    Care everywhere from male was accessed to find no EKG image, no INR, but they had given the patient a tetanus immunization.    EKG done here revealed a normal sinus rhythm at a rate of 78 with a CO interval 0.184 and a QRS duration of 0.086.  The patient had a leftward axis with no acute ST or T-wave changes significant for ischemia.  This is reviewed by me personally.    Trauma was called see the patient.    Patient had an IV initiated with bloods drawn and sent to " the lab.           Assessments & Plan (with Medical Decision Making)     I have reviewed the nursing notes.    I have reviewed the findings, diagnosis, and plan with the patient.    Final diagnoses:   Orbital floor (blow-out) closed fracture (H) - right, with muscle entrapment and retinal injury     Talat Benoit MD    6/1/2018   [facility], [place], EMERGENCY DEPARTMENT     Talat Benoit MD  06/01/18 1926

## 2018-06-02 NOTE — BRIEF OP NOTE
[facility] Brief Operative Note    Pre-operative diagnosis: ruptured globe    Post-operative diagnosis ruptured globe     Procedure: Procedure(s):  Right Globe and Right Upper Lid Laceration REPAIR RUPTURED GLOBE; Protected with AcuVue Oasis Contact Lens.  - Wound Class: I-Clean   Surgeon: Lisette Delgado MD   Assistants(s): oY Snyder MD   Estimated blood loss: Less than 10 ml    Specimens: None   Findings: See full operative report         Yo Snyder MD  PGY3

## 2018-06-04 ENCOUNTER — TRANSFERRED RECORDS (OUTPATIENT)
Dept: HEALTH INFORMATION MANAGEMENT | Facility: CLINIC | Age: 83
End: 2018-06-04

## 2018-06-04 ENCOUNTER — OFFICE VISIT (OUTPATIENT)
Dept: OPHTHALMOLOGY | Facility: CLINIC | Age: 83
End: 2018-06-04
Payer: COMMERCIAL

## 2018-06-04 ENCOUNTER — TELEPHONE (OUTPATIENT)
Dept: OPHTHALMOLOGY | Facility: CLINIC | Age: 83
End: 2018-06-04

## 2018-06-04 ENCOUNTER — CARE COORDINATION (OUTPATIENT)
Dept: CARE COORDINATION | Facility: CLINIC | Age: 83
End: 2018-06-04

## 2018-06-04 DIAGNOSIS — Z94.7 HISTORY OF PENETRATING KERATOPLASTY: Primary | ICD-10-CM

## 2018-06-04 DIAGNOSIS — S02.85XA CLOSED FRACTURE OF ORBIT, INITIAL ENCOUNTER (H): Primary | ICD-10-CM

## 2018-06-04 LAB — INTERPRETATION ECG - MUSE: NORMAL

## 2018-06-04 ASSESSMENT — TONOMETRY
OS_IOP_MMHG: 11
OD_IOP_MMHG: 8
IOP_METHOD: TONOPEN

## 2018-06-04 ASSESSMENT — CONF VISUAL FIELD
OD_INFERIOR_NASAL_RESTRICTION: 3
OD_INFERIOR_TEMPORAL_RESTRICTION: 3
OD_SUPERIOR_NASAL_RESTRICTION: 3
OD_SUPERIOR_TEMPORAL_RESTRICTION: 3
OS_NORMAL: 1

## 2018-06-04 ASSESSMENT — VISUAL ACUITY
OS_CC: 20/50
OD_CC: 20/250
METHOD: SNELLEN - LINEAR
CORRECTION_TYPE: GLASSES

## 2018-06-04 ASSESSMENT — SLIT LAMP EXAM - LIDS: COMMENTS: NORMAL

## 2018-06-04 NOTE — PROGRESS NOTES
Chief Complaints and History of Present Illnesses   Patient presents with     Consult For     Fracture Right eye     Here for evaluation of right orbital fracture.  He fell off his ride on mower and got a ruptured globe and right orbital fracture.  He has a history of a pentrating keratoplasty (PKP) and underwent repair on Friday night.  He is not having any pain on upgaze or double vision.  Vision in the right eye is blurry.         Scott Knott is a 86 year old male with the following diagnoses:   1. Closed fracture of orbit, initial encounter (H)       Right orbital floor blowout fracture with ruptured PKP  -no clinical entrapment of the inferior rectus  -hold on any consideration of repair given open globe and need for possible retinal surgery. Unlikely to require repair.  -RV here 1 month for follow up       Adele Coker MD  Ophthalmic Plastic and Reconstructive Surgery     Attending Physician Attestation:  Complete documentation of historical and exam elements from today's encounter can be found in the full encounter summary report (not reduplicated in this progress note). I personally obtained the chief complaint(s) and history of present illness.  I confirmed and edited as necessary the review of systems, past medical/surgical history, family history, social history, and examination findings as documented by others; and I examined the patient myself. I personally reviewed the relevant tests, images, and reports as documented above. I formulated and edited as necessary the assessment and plan and discussed the findings and management plan with the patient and family.  -Adele Coker MD 10:04 AM 6/4/2018

## 2018-06-04 NOTE — NURSING NOTE
Chief Complaints and History of Present Illnesses   Patient presents with     Consult For     Fracture Right eye     HPI    Affected eye(s):  Right   Symptoms:     Blurred vision   No double vision      Frequency:  Constant       Do you have eye pain now?:  No      Comments:  Friday am on June 1 - tipped over on riding - and right eye was injured from . Pt states had surgery with Dr. Delgado in hospital for ruptured globe repair. No pain.     Alpa Mosher COT 9:44 AM June 4, 2018

## 2018-06-04 NOTE — MR AVS SNAPSHOT
After Visit Summary   2018    Scott Knott    MRN: [id number]           Patient Information     Date Of Birth          1931        Visit Information        Provider Department      2018 9:00 AM Adele Coker MD [facility] Ophthalmology        Today's Diagnoses     Closed fracture of orbit, initial encounter (H)    -  1       Follow-ups after your visit        Your next 10 appointments already scheduled     Jul 10, 2018 12:15 PM CDT   (Arrive by 12:00 PM)   RETURN PLASTICS with Salina Sevilla MD   [facility] Ophthalmology ([facility])    [address] [place] [id number] [phone]              Who to contact     Please call your clinic at [phone] to:    Ask questions about your health    Make or cancel appointments    Discuss your medicines    Learn about your test results    Speak to your doctor            Additional Information About Your Visit        MyChart Information     [employer] is an electronic gateway that provides easy, online access to your medical records. With [employer], you can request a clinic appointment, read your test results, renew a prescription or communicate with your care team.     To sign up for [employer] visit the website at [email or web address]   You will be asked to enter the access code listed below, as well as some personal information. Please follow the directions to create your username and password.     Your access code is: [email or web address]  Expires: 2018  9:48 AM     Your access code will  in 90 days. If you need help or a new code, please contact your [facility] or call [phone] for assistance.        Care EveryWhere ID     This is your Care EveryWhere ID. This could be used by other organizations to access your [place] medical records  [id number]         Blood Pressure from Last 3 Encounters:   18 140/71    Weight from Last 3  Encounters:   06/01/18 104.3 kg (230 lb)              Today, you had the following     No orders found for display       Primary Care Provider Office Phone # Fax #    Clarissa Tai [phone]       [facility] [place] [address]  [place] [address]        Equal Access to Services     GORDON BROWN : Hadii aad ku hadasho Soomaali, waaxda luqadaha, qaybta kaalmada adeegyada, waxay idiin hayaan adeeg khellish la'aan ah. So [facility] [phone].    ATENCIÓN: Si habla español, tiene a patel disposición servicios gratuitos de asistencia lingüística. [facility] [phone].    We comply with applicable federal civil rights laws and [place] laws. We do not discriminate on the basis of race, color, national origin, age, disability, sex, sexual orientation, or gender identity.            Thank you!     Thank you for choosing [facility] OPHTHALMOLOGY  for your care. Our goal is always to provide you with excellent care. Hearing back from our patients is one way we can continue to improve our services. Please take a few minutes to complete the written survey that you may receive in the mail after your visit with us. Thank you!             Your Updated Medication List - Protect others around you: Learn how to safely use, store and throw away your medicines at www.disposemymeds.org.          This list is accurate as of 6/4/18 10:16 AM.  Always use your most recent med list.                   Brand Name Dispense Instructions for use Diagnosis    acyclovir 400 MG tablet    ZOVIRAX     Take 400 mg by mouth daily        amLODIPine 5 MG tablet    NORVASC     Take 7.5 mg by mouth daily        atenolol 50 MG tablet    TENORMIN     Take 50 mg by mouth 2 times daily        atorvastatin 40 MG tablet    LIPITOR     Take 40 mg by mouth At Bedtime        HYDROcodone-acetaminophen 5-325 MG per tablet    NORCO     Take 1-2 tablets by mouth 3 times daily as needed for severe pain        isosorbide mononitrate 30 MG 24 hr tablet     IMDUR     Take 15 mg by mouth daily        neomycin-polymyxin-dexamethasone 3.5-92390-3.1 Oint ophthalmic ointment    MAXITROL    1 Tube    Place 1 Application into the right eye 4 times daily On eyelid    Surgical aftercare, sense organs       nitroGLYcerin 0.4 MG sublingual tablet    NITROSTAT     Place 0.4 mg under the tongue every 5 minutes as needed for chest pain For chest pain place 1 tablet under the tongue every 5 minutes for 3 doses. If symptoms persist 5 minutes after 1st dose call 911.        ofloxacin 0.3 % ophthalmic solution    OCUFLOX    1 Bottle    Place 1 drop into the right eye 4 times daily    Surgical aftercare, sense organs       omeprazole 20 MG CR capsule    priLOSEC     Take 20 mg by mouth daily        * oxyCODONE-acetaminophen 5-325 MG per tablet    PERCOCET    12 tablet    Take 1 tablet by mouth every 6 hours as needed for pain    Surgical aftercare, sense organs       * oxyCODONE-acetaminophen 5-325 MG per tablet    PERCOCET    20 tablet    Take 1 tablet by mouth every 6 hours as needed for pain    Surgical aftercare, sense organs, History of penetrating keratoplasty       prednisoLONE acetate 1 % ophthalmic susp    PRED FORTE    1 Bottle    Place 1 drop into the right eye 4 times daily    Surgical aftercare, sense organs       sulfaSALAzine 500 MG tablet    AZULFIDINE     Take 500 mg by mouth 4 times daily as needed (colitis)        traMADol 50 MG tablet    ULTRAM     Take  mg by mouth every 4 hours as needed for pain or severe pain        * Notice:  This list has 2 medication(s) that are the same as other medications prescribed for you. Read the directions carefully, and ask your doctor or other care provider to review them with you.

## 2018-06-04 NOTE — PROGRESS NOTES
Patient has clinic visit within 24-48 hours of Discharge so no post DC follow up call is needed              Ophthalmology (Eye) Clinic:  Follow up with plastic on MondaySchedule follow up with Oculoplastic and retina on Monday  Floor [address]   [address] [place] [id number]   Appointments: [phone]        Date: 6/4/2018 Status: Arrived   Time: 9:00 AM Length: 15     Visit Type: [facility] NEW PLASTICS [[id number]]   TYLER:     Provider: Adele Coker MD Department:  EYE GENERAL   Special Needs:   Comments:     Referral Number:   Referral Status:     Enc Form Number: [id number] CSN: [id number]   Notes: fracture, pt called and will be late, ok to see

## 2018-06-15 ENCOUNTER — TRANSFERRED RECORDS (OUTPATIENT)
Dept: HEALTH INFORMATION MANAGEMENT | Facility: CLINIC | Age: 83
End: 2018-06-15

## 2018-07-10 ENCOUNTER — OFFICE VISIT (OUTPATIENT)
Dept: OPHTHALMOLOGY | Facility: CLINIC | Age: 83
End: 2018-07-10
Payer: COMMERCIAL

## 2018-07-10 DIAGNOSIS — Z94.7 HISTORY OF PENETRATING KERATOPLASTY: ICD-10-CM

## 2018-07-10 DIAGNOSIS — S02.85XA CLOSED FRACTURE OF ORBIT, INITIAL ENCOUNTER (H): Primary | ICD-10-CM

## 2018-07-10 ASSESSMENT — CONF VISUAL FIELD
OD_INFERIOR_TEMPORAL_RESTRICTION: 3
OD_SUPERIOR_NASAL_RESTRICTION: 3
OS_NORMAL: 1
OD_SUPERIOR_TEMPORAL_RESTRICTION: 3
OD_INFERIOR_NASAL_RESTRICTION: 3

## 2018-07-10 ASSESSMENT — VISUAL ACUITY
OD_CC: 20/200
CORRECTION_TYPE: GLASSES
METHOD: SNELLEN - LINEAR
OS_CC+: +1
OS_CC: 20/50

## 2018-07-10 ASSESSMENT — SLIT LAMP EXAM - LIDS: COMMENTS: NORMAL

## 2018-07-10 ASSESSMENT — TONOMETRY
IOP_METHOD: ICARE
OD_IOP_MMHG: 11
OS_IOP_MMHG: 14

## 2018-07-10 NOTE — NURSING NOTE
"Chief Complaints and History of Present Illnesses   Patient presents with     Orbital Fracture Follow Up     1 month f/u RE     HPI    Affected eye(s):  Right   Symptoms:     Blurred vision   Foreign body sensation (Comment: \"scratchy feeling\" per pt)   Tearing (Comment: \"almost constantly, but much less than what it has been\" per pt)   Eye discharge         Do you have eye pain now?:  No      Comments:    Patient notes that the medication \"is less\", started anti rejection medication 3-4 days ago:    Patient is using FML qod  Maxitrol qod    Patient is seeing a retina doctor on Friday, dr. Bright, states he has a difficult time opening the RE, notes that the RUL was droopy since before the accident.       Gertrude Gonzalez July 10, 2018 12:21 PM               "

## 2018-07-10 NOTE — PROGRESS NOTES
Chief Complaints and History of Present Illnesses   Patient presents with     Orbital Fracture Follow Up     1 month f/u RE     S/p repair of dehisced pentrating keratoplasty (PKP) with Dr. Delgado following trauma. He fell off of his riding mower. This was repaired 6/1/18. Referred for eval/management of bofx. He did see Dr. Jackman of [facility], who notes retina is healthy, and is observing.       FINDINGS: There is a right-sided inferior orbital wall blowout fracture with  evidence of muscular entrapment. The fracture involves the infraorbital foramen.  The inferior rectus muscle extends into the fracture. No substantial  intraorbital hematoma, however there is some preseptal hematoma and preseptal  soft tissue swelling about the right orbit. No fracture of the nasal bones or  trauma. Maxilla and mandible appear intact. Poor dentition with upper dentures  and multiple dental caries and missing teeth in the mandible. Oral cavity, floor  of mouth, and cervical soft tissues appear overall normal. Hemorrhage in the  right maxillary sinus, remaining paranasal sinuses are clear.       Assessment & Plan     Scott Knott is a 86 year old male with the following diagnoses:   1. Closed fracture of orbit, initial encounter (H)    2. History of penetrating keratoplasty - Right Eye       Minimal enophthalmos  Monocular diplopia  Observe bofx  F/u with Dr. Varma for care of graft and can f/u with Dr. Farley if needed.  I'm happy to help with ptosis repair if desires.         Attending Physician Attestation:  Complete documentation of historical and exam elements from today's encounter can be found in the full encounter summary report (not reduplicated in this progress note).  I personally obtained the chief complaint(s) and history of present illness.  I confirmed and edited as necessary the review of systems, past medical/surgical history, family history, social history, and examination findings as documented by others; and I  examined the patient myself.  I personally reviewed the relevant tests, images, and reports as documented above.  I formulated and edited as necessary the assessment and plan and discussed the findings and management plan with the patient and family. - Salina Sevilla MD

## 2018-07-10 NOTE — MR AVS SNAPSHOT
After Visit Summary   7/10/2018    Scott Knott    MRN: [id number]           Patient Information     Date Of Birth          8/16/1931        Visit Information        Provider Department      7/10/2018 12:15 PM Salina Sevilla MD [facility] Ophthalmology         Follow-ups after your visit        Your next 10 appointments already scheduled     Jul 10, 2018 12:15 PM CDT   (Arrive by 12:00 PM)   RETURN PLASTICS with MD MELLO Sandoval [facility] Ophthalmology ([facility] [place])    [address] [place] [id number] [phone]              Who to contact     Please call your clinic at [phone] to:    Ask questions about your health    Make or cancel appointments    Discuss your medicines    Learn about your test results    Speak to your doctor            Additional Information About Your Visit        Care EveryWhere ID     This is your Care EveryWhere ID. This could be used by other organizations to access your [place] medical records  [id number]         Blood Pressure from Last 3 Encounters:   06/02/18 140/71    Weight from Last 3 Encounters:   06/01/18 104.3 kg (230 lb)              Today, you had the following     No orders found for display       Primary Care Provider Office Phone # Fax #    Clarissa Tai [phone]       [address] [address]        Equal Access to Services     GORDON BROWN AH: Hadii clement ku hadasho Soomaali, waaxda luqadaha, qaybta kaalmada adeegyada, rocco melgarin haykristenn carmen ocampo . So [facility] [phone].    ATENCIÓN: Si habla español, tiene a patel disposición servicios gratmiraos de asistencia lingüística. ame al [phone].    We comply with applicable federal civil rights laws and [place] laws. We do not discriminate on the basis of race, color, national origin, age, disability, sex, sexual orientation, or gender identity.            Thank you!     Thank you  for choosing [facility] OPHTHALMOLOGY  for your care. Our goal is always to provide you with excellent care. Hearing back from our patients is one way we can continue to improve our services. Please take a few minutes to complete the written survey that you may receive in the mail after your visit with us. Thank you!             Your Updated Medication List - Protect others around you: Learn how to safely use, store and throw away your medicines at www.disposemymeds.org.          This list is accurate as of 7/10/18 12:00 PM.  Always use your most recent med list.                   Brand Name Dispense Instructions for use Diagnosis    acyclovir 400 MG tablet    ZOVIRAX     Take 400 mg by mouth daily        amLODIPine 5 MG tablet    NORVASC     Take 7.5 mg by mouth daily        atenolol 50 MG tablet    TENORMIN     Take 50 mg by mouth 2 times daily        atorvastatin 40 MG tablet    LIPITOR     Take 40 mg by mouth At Bedtime        HYDROcodone-acetaminophen 5-325 MG per tablet    NORCO     Take 1-2 tablets by mouth 3 times daily as needed for severe pain        isosorbide mononitrate 30 MG 24 hr tablet    IMDUR     Take 15 mg by mouth daily        neomycin-polymyxin-dexamethasone 3.5-46936-8.1 Oint ophthalmic ointment    MAXITROL    1 Tube    Place 1 Application into the right eye 4 times daily On eyelid    Surgical aftercare, sense organs       nitroGLYcerin 0.4 MG sublingual tablet    NITROSTAT     Place 0.4 mg under the tongue every 5 minutes as needed for chest pain For chest pain place 1 tablet under the tongue every 5 minutes for 3 doses. If symptoms persist 5 minutes after 1st dose call 911.        ofloxacin 0.3 % ophthalmic solution    OCUFLOX    1 Bottle    Place 1 drop into the right eye 4 times daily    Surgical aftercare, sense organs       omeprazole 20 MG CR capsule    priLOSEC     Take 20 mg by mouth daily        * oxyCODONE-acetaminophen 5-325 MG per tablet    PERCOCET    12 tablet    Take 1 tablet by  mouth every 6 hours as needed for pain    Surgical aftercare, sense organs       * oxyCODONE-acetaminophen 5-325 MG per tablet    PERCOCET    20 tablet    Take 1 tablet by mouth every 6 hours as needed for pain    Surgical aftercare, sense organs, History of penetrating keratoplasty       prednisoLONE acetate 1 % ophthalmic susp    PRED FORTE    1 Bottle    Place 1 drop into the right eye 4 times daily    Surgical aftercare, sense organs       sulfaSALAzine 500 MG tablet    AZULFIDINE     Take 500 mg by mouth 4 times daily as needed (colitis)        traMADol 50 MG tablet    ULTRAM     Take  mg by mouth every 4 hours as needed for pain or severe pain        * Notice:  This list has 2 medication(s) that are the same as other medications prescribed for you. Read the directions carefully, and ask your doctor or other care provider to review them with you.

## 2018-07-13 ENCOUNTER — TELEPHONE (OUTPATIENT)
Dept: OPHTHALMOLOGY | Facility: CLINIC | Age: 83
End: 2018-07-13

## 2018-07-13 ENCOUNTER — TRANSFERRED RECORDS (OUTPATIENT)
Dept: HEALTH INFORMATION MANAGEMENT | Facility: CLINIC | Age: 83
End: 2018-07-13

## 2018-07-13 NOTE — TELEPHONE ENCOUNTER
Health Call Center    Phone Message    May a detailed message be left on voicemail: yes    Reason for Call: Other: The pt's wife Micaela is asking about the pt getting eyelid repair. The pt saw Dr Downing on 7.10.18. The questions is - Does the pt need another consult, or can they schedule the repair surg now? Please call the pt and his wife to discuss. If they need a consult, can it be on the same day as Dr Farley? Thanks     Action Taken: Message routed to:  [facility] ([facility]

## 2018-07-16 NOTE — TELEPHONE ENCOUNTER
After consulting with Dr. Sevilla, he asked patient be seen in clinic first for new patient chaparrita    I called patient to schedule and he decided he will wait to schedule anything until after he sees Dr. Farley.      Patient had no further questions.

## 2018-08-02 ENCOUNTER — OFFICE VISIT (OUTPATIENT)
Dept: OPHTHALMOLOGY | Facility: CLINIC | Age: 83
End: 2018-08-02
Attending: OPHTHALMOLOGY
Payer: MEDICARE

## 2018-08-02 DIAGNOSIS — Z48.810 SURGICAL AFTERCARE, SENSE ORGANS: ICD-10-CM

## 2018-08-02 PROCEDURE — G0463 HOSPITAL OUTPT CLINIC VISIT: HCPCS | Mod: ZF

## 2018-08-02 RX ORDER — OFLOXACIN 3 MG/ML
1 SOLUTION/ DROPS OPHTHALMIC 2 TIMES DAILY
Qty: 1 BOTTLE | Refills: 3 | Status: SHIPPED | OUTPATIENT
Start: 2018-08-02 | End: 2019-01-17

## 2018-08-02 ASSESSMENT — VISUAL ACUITY
CORRECTION_TYPE: GLASSES
OS_CC: 20/40
OD_PH_SC: 20/125
OD_PH_CC: 20/125
OD_CC: 20/300
METHOD: SNELLEN - LINEAR

## 2018-08-02 ASSESSMENT — PACHYMETRY
OD_CT(UM): 626
OS_CT(UM): 524

## 2018-08-02 ASSESSMENT — TONOMETRY
OS_IOP_MMHG: 11
IOP_METHOD: ICARE
OD_IOP_MMHG: 6

## 2018-08-02 ASSESSMENT — SLIT LAMP EXAM - LIDS: COMMENTS: NORMAL

## 2018-08-02 NOTE — MR AVS SNAPSHOT
After Visit Summary   8/2/2018    Scott Knott    MRN: [id number]           Patient Information     Date Of Birth          8/16/1931        Visit Information        Provider Department      8/2/2018 9:15 AM Modesto Farley MD [facility]        Today's Diagnoses     Surgical aftercare, sense organs           Follow-ups after your visit        Follow-up notes from your care team     Return in about 2 months (around 10/2/2018) for follow up .      Your next 10 appointments already scheduled     Oct 04, 2018  9:30 AM CDT   RETURN CORNEA with Modesto Farley MD   Eye Clinic ([facility])    [address]  [facility] [id number] [phone]              Who to contact     Please call your clinic at [phone] to:    Ask questions about your health    Make or cancel appointments    Discuss your medicines    Learn about your test results    Speak to your doctor            Additional Information About Your Visit        Care EveryWhere ID     This is your Care EveryWhere ID. This could be used by other organizations to access your [place] medical records  [id number]         Blood Pressure from Last 3 Encounters:   06/02/18 140/71    Weight from Last 3 Encounters:   06/01/18 104.3 kg (230 lb)              Today, you had the following     No orders found for display         Today's Medication Changes          These changes are accurate as of 8/2/18 11:59 PM.  If you have any questions, ask your nurse or doctor.               These medicines have changed or have updated prescriptions.        Dose/Directions    ofloxacin 0.3 % ophthalmic solution   Commonly known as:  OCUFLOX   This may have changed:  when to take this   Used for:  Surgical aftercare, sense organs   Changed by:  Modesto Farley MD        Dose:  1 drop   Place 1 drop into the right eye 2 times daily   Quantity:  1 Bottle   Refills:  3            Where to get your  medicines      These medications were sent to [facility] [place] - [place] - [address]  [address] [facility] [address]     Phone:  [phone]     ofloxacin 0.3 % ophthalmic solution                Primary Care Provider Office Phone # Fax #    Clarissa Tai [phone]       [facility] [address] [facility] [address]        Equal Access to Services     GORDON BROWN : Hadii aad ku hadasho Soomaali, waaxda luqadaha, qaybta kaalmada adeegyada, waxay idiin hayaan adeeg kharash lazhanna . So [facility] [phone].    ATENCIÓN: Si patsy dimas, tiene a patel disposición servicios gratuitos de asistencia lingüística. [facility] [phone].    We comply with applicable federal civil rights laws and [place] laws. We do not discriminate on the basis of race, color, national origin, age, disability, sex, sexual orientation, or gender identity.            Thank you!     Thank you for choosing EYE CLINIC  for your care. Our goal is always to provide you with excellent care. Hearing back from our patients is one way we can continue to improve our services. Please take a few minutes to complete the written survey that you may receive in the mail after your visit with us. Thank you!             Your Updated Medication List - Protect others around you: Learn how to safely use, store and throw away your medicines at www.disposemymeds.org.          This list is accurate as of 8/2/18 11:59 PM.  Always use your most recent med list.                   Brand Name Dispense Instructions for use Diagnosis    acyclovir 400 MG tablet    ZOVIRAX     Take 400 mg by mouth daily        amLODIPine 5 MG tablet    NORVASC     Take 7.5 mg by mouth daily        atenolol 50 MG tablet    TENORMIN     Take 50 mg by mouth 2 times daily        atorvastatin 40 MG tablet    LIPITOR     Take 40 mg by mouth At Bedtime        HYDROcodone-acetaminophen 5-325 MG per tablet    NORCO     Take 1-2 tablets by  mouth 3 times daily as needed for severe pain        isosorbide mononitrate 30 MG 24 hr tablet    IMDUR     Take 15 mg by mouth daily        neomycin-polymyxin-dexamethasone 3.5-04133-2.1 Oint ophthalmic ointment    MAXITROL    1 Tube    Place 1 Application into the right eye 4 times daily On eyelid    Surgical aftercare, sense organs       nitroGLYcerin 0.4 MG sublingual tablet    NITROSTAT     Place 0.4 mg under the tongue every 5 minutes as needed for chest pain For chest pain place 1 tablet under the tongue every 5 minutes for 3 doses. If symptoms persist 5 minutes after 1st dose call 911.        ofloxacin 0.3 % ophthalmic solution    OCUFLOX    1 Bottle    Place 1 drop into the right eye 2 times daily    Surgical aftercare, sense organs       omeprazole 20 MG CR capsule    priLOSEC     Take 20 mg by mouth daily        * oxyCODONE-acetaminophen 5-325 MG per tablet    PERCOCET    12 tablet    Take 1 tablet by mouth every 6 hours as needed for pain    Surgical aftercare, sense organs       * oxyCODONE-acetaminophen 5-325 MG per tablet    PERCOCET    20 tablet    Take 1 tablet by mouth every 6 hours as needed for pain    Surgical aftercare, sense organs, History of penetrating keratoplasty       prednisoLONE acetate 1 % ophthalmic susp    PRED FORTE    1 Bottle    Place 1 drop into the right eye 4 times daily    Surgical aftercare, sense organs       sulfaSALAzine 500 MG tablet    AZULFIDINE     Take 500 mg by mouth 4 times daily as needed (colitis)        traMADol 50 MG tablet    ULTRAM     Take  mg by mouth every 4 hours as needed for pain or severe pain        * Notice:  This list has 2 medication(s) that are the same as other medications prescribed for you. Read the directions carefully, and ask your doctor or other care provider to review them with you.

## 2018-08-02 NOTE — NURSING NOTE
Chief Complaints and History of Present Illnesses   Patient presents with     Follow Up For     Rt Orbital Floor Blowout with Ruptured PK      HPI    Last Eye Exam:  7/10/18   Affected eye(s):  Right   Symptoms:     Blurred vision   No floaters   No flashes   Tearing   Eye discharge      Duration:  1 month   Frequency:  Constant       Do you have eye pain now?:  No      Comments:  Pt returns for 2 month follow up. Notes that vision is still very blurry RE. Notes that he is using FML RE every day. BRENDA GONZALEZ, IRON 10:00 AM 08/02/2018

## 2018-08-02 NOTE — PROGRESS NOTES
CC: 85 year old male presents for blurry vision s/p penetrating keratoplasty (PK) to right eye    HPI: History of HSV keratitis with scarring now s/p pentrating keratoplasty (PKP) x3 (last 2000). Most recently with right orbital fracture with PK dehiscence from 7 to 12 o'clock on 6/1/2018.     Interval Update: Reports minimal scratchiness, tearing of right eye, vision down since injury, denies new floaters or flashes.     Ocular Meds:   FML at bedtime right eye   Acyclovir 400 mg twice a day     Assessment/Plan:    1. S/P Triple penetrating keratoplasty (PK) OD 2000 with Dr. Lozano  Now with recent injury with dehiscence of graft from 7 to 12 o'clock with iris prolapse, wound well approximated with buried sutures and dermabond corneal glue     - graft clear, temporal sutures present with overlying dermabond well adhered   - placed bandage contact lens today for comfort     - continue FML qday  - start ofloxacin twice a day left eye      2. Pseudophakia both eyes  - s/p Cataract extraction with Dr. Varma 2013    3. Posterior capsular opacity (PCO), left eye:  - continue to monitor    4. History of recurrent Herpes simples virus keratitis   s/p penetrating keratoplasty (PK)  - continue acyclovir 400mg BID    5. 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 Jackman  - Follows with Dr. Varma for annual exam     6. Steroid responder per history  - good IOP today     Return to clinic 2 mo earlier as needed     Jordan Herring MD  Ophthalmology Resident, PGY-3  [facility]        ~~~~~~~~~~~~~~~~~~~~~~~~~~~~~~~~~~~~~~~~~~~~~~~~~~~~~~~~~~~~~~~~    Complete documentation of historical and exam elements from today's encounter can be found in the full encounter summary report (not reduplicated in this progress note). I personally obtained the chief complaint(s) and history of present illness.  I confirmed and edited as necessary the review  of systems, past medical/surgical history, family history, social history, and examination findings as documented by others.  I examined the patient myself, and I personally reviewed the relevant tests, images, and reports as documented above. I formulated and edited as necessary the assessment and plan and discussed the findings and management plan with the patient and family.     Modesto Farley MD, MA  Director, Cornea & Anterior Segment  [facility] Department of Ophthalmology & Visual Neuroscience

## 2018-10-04 ENCOUNTER — OFFICE VISIT (OUTPATIENT)
Dept: OPHTHALMOLOGY | Facility: CLINIC | Age: 83
End: 2018-10-04
Attending: OPHTHALMOLOGY
Payer: MEDICARE

## 2018-10-04 DIAGNOSIS — Z94.7 HISTORY OF PENETRATING KERATOPLASTY: Primary | ICD-10-CM

## 2018-10-04 DIAGNOSIS — Z48.810 SURGICAL AFTERCARE, SENSE ORGANS: ICD-10-CM

## 2018-10-04 PROCEDURE — G0463 HOSPITAL OUTPT CLINIC VISIT: HCPCS | Mod: ZF

## 2018-10-04 ASSESSMENT — REFRACTION_WEARINGRX
OS_SPHERE: -2.50
OS_ADD: +2.50
OD_CYLINDER: +4.50
OD_ADD: +2.50
OS_AXIS: 160
SPECS_TYPE: BIFOCAL
OS_CYLINDER: +2.00
OD_SPHERE: -2.50
OD_AXIS: 160

## 2018-10-04 ASSESSMENT — CONF VISUAL FIELD
OD_SUPERIOR_NASAL_RESTRICTION: 3
OD_SUPERIOR_TEMPORAL_RESTRICTION: 3
OD_INFERIOR_NASAL_RESTRICTION: 3
OD_INFERIOR_TEMPORAL_RESTRICTION: 3

## 2018-10-04 ASSESSMENT — TONOMETRY
OD_IOP_MMHG: 08
OS_IOP_MMHG: 11
IOP_METHOD: ICARE

## 2018-10-04 ASSESSMENT — VISUAL ACUITY
METHOD: SNELLEN - LINEAR
OD_CC: 20/200
OS_CC+: -2
OS_CC: 20/30
OD_PH_CC: 20/100
CORRECTION_TYPE: GLASSES

## 2018-10-04 NOTE — MR AVS SNAPSHOT
After Visit Summary   10/4/2018    Scott Knott    MRN: [id number]           Patient Information     Date Of Birth          1931        Visit Information        Provider Department      10/4/2018 9:30 AM Modesto Farley MD [facility]        Today's Diagnoses     History of penetrating keratoplasty - Right Eye    -  1    Surgical aftercare, sense organs           Follow-ups after your visit        Follow-up notes from your care team     Return in about 3 months (around 2019).      Who to contact     Please call your clinic at [phone] to:    Ask questions about your health    Make or cancel appointments    Discuss your medicines    Learn about your test results    Speak to your doctor            Additional Information About Your Visit        MyChart Information     [email or web address] is an electronic gateway that provides easy, online access to your medical records. With [employer], you can request a clinic appointment, read your test results, renew a prescription or communicate with your care team.     To sign up for [employer] visit the website at [email or web address]   You will be asked to enter the access code listed below, as well as some personal information. Please follow the directions to create your username and password.     Your access code is: [email or web address]  Expires: 2019  1:56 PM     Your access code will  in 90 days. If you need help or a new code, please contact your [facility] or call [phone] for assistance.        Care EveryWhere ID     This is your Care EveryWhere ID. This could be used by other organizations to access your [place] medical records  [id number]         Blood Pressure from Last 3 Encounters:   18 140/71    Weight from Last 3 Encounters:   18 104.3 kg (230 lb)              Today, you had the following     No orders found for display       Primary Care Provider Office Phone # Fax #    Clarissa Tai  [phone]       [facility] [place] [address]        Equal Access to Services     GORDON BROWN : Hadii clement sommer maegan Sotiffany, waroseda luqadaha, qaybta kaalmada refugio, rocco wu. So [facility] [phone].    ATENCIÓN: Si habla español, tiene a patel disposición servicios gratuitos de asistencia lingüística. Llame al [phone].    We comply with applicable federal civil rights laws and [place] laws. We do not discriminate on the basis of race, color, national origin, age, disability, sex, sexual orientation, or gender identity.            Thank you!     Thank you for choosing EYE CLINIC  for your care. Our goal is always to provide you with excellent care. Hearing back from our patients is one way we can continue to improve our services. Please take a few minutes to complete the written survey that you may receive in the mail after your visit with us. Thank you!             Your Updated Medication List - Protect others around you: Learn how to safely use, store and throw away your medicines at www.disposemymeds.org.          This list is accurate as of 10/4/18 11:59 PM.  Always use your most recent med list.                   Brand Name Dispense Instructions for use Diagnosis    acyclovir 400 MG tablet    ZOVIRAX     Take 400 mg by mouth daily        amLODIPine 5 MG tablet    NORVASC     Take 7.5 mg by mouth daily        atenolol 50 MG tablet    TENORMIN     Take 50 mg by mouth 2 times daily        atorvastatin 40 MG tablet    LIPITOR     Take 40 mg by mouth At Bedtime        HYDROcodone-acetaminophen 5-325 MG per tablet    NORCO     Take 1-2 tablets by mouth 3 times daily as needed for severe pain        isosorbide mononitrate 30 MG 24 hr tablet    IMDUR     Take 15 mg by mouth daily        neomycin-polymyxin-dexamethasone 3.5-20659-5.1 Oint ophthalmic ointment    MAXITROL    1 Tube    Place 1 Application into the right eye 4 times daily On  eyelid    Surgical aftercare, sense organs       nitroGLYcerin 0.4 MG sublingual tablet    NITROSTAT     Place 0.4 mg under the tongue every 5 minutes as needed for chest pain For chest pain place 1 tablet under the tongue every 5 minutes for 3 doses. If symptoms persist 5 minutes after 1st dose call 911.        ofloxacin 0.3 % ophthalmic solution    OCUFLOX    1 Bottle    Place 1 drop into the right eye 2 times daily    Surgical aftercare, sense organs       omeprazole 20 MG CR capsule    priLOSEC     Take 20 mg by mouth daily        sulfaSALAzine 500 MG tablet    AZULFIDINE     Take 500 mg by mouth 4 times daily as needed (colitis)        traMADol 50 MG tablet    ULTRAM     Take  mg by mouth every 4 hours as needed for pain or severe pain

## 2018-10-04 NOTE — NURSING NOTE
Chief Complaints and History of Present Illnesses   Patient presents with     Follow Up For     S/P Triple penetrating keratoplasty      HPI    Affected eye(s):  Right   Symptoms:        Frequency:  Constant       Do you have eye pain now?:  No      Comments:  Feels that the RE has improved  +watery more  Cira RICO 10:04 AM October 4, 2018

## 2018-10-04 NOTE — PROGRESS NOTES
CC: 87 year old male presents for blurry vision s/p penetrating keratoplasty (PK) to right eye    HPI: History of HSV keratitis with scarring now s/p pentrating keratoplasty (PKP) x3 (last 2000). Most recently with right orbital fracture with PK dehiscence from 7 to 12 o'clock on 6/1/2018.     Interval Update: has had a few CTL exchanges with Dr. Varma since last visit    Ocular Meds:     oflox twice a day   FML at bedtime right eye   Acyclovir 400 mg twice a day     Assessment/Plan:    1. S/P Triple penetrating keratoplasty (PK) OD 2000 with Dr. Lozano  Now with recent injury with dehiscence of graft from 7 to 12 o'clock with iris prolapse, wound well approximated with buried sutures and dermabond corneal glue     Glue removed today with forceps  Several loose sutures removed  Remains Hemanth neg    - continue FML qday  - oflox four times a day  X 4 days then stop     2. Pseudophakia both eyes  - s/p Cataract extraction with Dr. Varma 2013    3. Posterior capsular opacity (PCO), left eye:  - continue to monitor    4. History of recurrent Herpes simples virus keratitis   s/p penetrating keratoplasty (PK)  - continue acyclovir 400mg BID    5. 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 Jackman  - Follows with Dr. Varma for annual exam     6. Steroid responder per history  - good IOP today     Return to clinic 2-3 mo earlier as needed     Jordan Herring MD  Ophthalmology Resident, PGY-3  [facility]        ~~~~~~~~~~~~~~~~~~~~~~~~~~~~~~~~~~~~~~~~~~~~~~~~~~~~~~~~~~~~~~~~    Complete documentation of historical and exam elements from today's encounter can be found in the full encounter summary report (not reduplicated in this progress note). I personally obtained the chief complaint(s) and history of present illness.  I confirmed and edited as necessary the review of systems, past medical/surgical history, family history, social  history, and examination findings as documented by others.  I examined the patient myself, and I personally reviewed the relevant tests, images, and reports as documented above. I formulated and edited as necessary the assessment and plan and discussed the findings and management plan with the patient and family.     Modesto Farley MD, MA  Director, Cornea & Anterior Segment  [facility] Department of Ophthalmology & Visual Neuroscience

## 2018-10-19 ASSESSMENT — SLIT LAMP EXAM - LIDS: COMMENTS: NORMAL

## 2019-01-11 ENCOUNTER — TRANSFERRED RECORDS (OUTPATIENT)
Dept: HEALTH INFORMATION MANAGEMENT | Facility: CLINIC | Age: 84
End: 2019-01-11

## 2019-01-14 ENCOUNTER — TELEPHONE (OUTPATIENT)
Dept: OPHTHALMOLOGY | Facility: CLINIC | Age: 84
End: 2019-01-14

## 2019-01-14 NOTE — TELEPHONE ENCOUNTER
[facility] Call Center    Phone Message    May a detailed message be left on voicemail: yes    Reason for Call: Other: Pt wife says Pt other provider insists lazaro Farley try to fit this Pt is ASAP.  Please contact the Pt wife to slarify and schedule as needed.     Action Taken: Message routed to:  [facility] clinic

## 2019-01-17 ENCOUNTER — OFFICE VISIT (OUTPATIENT)
Dept: OPHTHALMOLOGY | Facility: CLINIC | Age: 84
End: 2019-01-17
Attending: OPHTHALMOLOGY
Payer: MEDICARE

## 2019-01-17 DIAGNOSIS — Z94.7 STATUS POST PENETRATING KERATOPLASTY: Primary | ICD-10-CM

## 2019-01-17 DIAGNOSIS — Z48.810 SURGICAL AFTERCARE, SENSE ORGANS: ICD-10-CM

## 2019-01-17 PROCEDURE — 92025 CPTRIZED CORNEAL TOPOGRAPHY: CPT | Mod: ZF | Performed by: OPHTHALMOLOGY

## 2019-01-17 PROCEDURE — G0463 HOSPITAL OUTPT CLINIC VISIT: HCPCS | Mod: ZF

## 2019-01-17 RX ORDER — OFLOXACIN 3 MG/ML
1 SOLUTION/ DROPS OPHTHALMIC 4 TIMES DAILY
Qty: 1 BOTTLE | Refills: 3 | Status: SHIPPED | OUTPATIENT
Start: 2019-01-17 | End: 2019-05-16

## 2019-01-17 RX ORDER — PREDNISOLONE ACETATE 10 MG/ML
1 SUSPENSION/ DROPS OPHTHALMIC 4 TIMES DAILY
COMMUNITY

## 2019-01-17 ASSESSMENT — REFRACTION_WEARINGRX
OS_ADD: +2.50
OS_SPHERE: -2.50
OS_AXIS: 160
OS_CYLINDER: +2.00
OD_AXIS: 160
OD_CYLINDER: +4.50
OD_SPHERE: -2.50
OD_ADD: +2.50
SPECS_TYPE: BIFOCAL

## 2019-01-17 ASSESSMENT — VISUAL ACUITY
METHOD: SNELLEN - LINEAR
OS_CC: 20/30
OD_CC: 20/250
CORRECTION_TYPE: GLASSES
OD_PH_CC: 20/100
OS_CC+: +1

## 2019-01-17 ASSESSMENT — SLIT LAMP EXAM - LIDS: COMMENTS: NORMAL

## 2019-01-17 ASSESSMENT — CONF VISUAL FIELD
OD_NORMAL: 1
OS_NORMAL: 1

## 2019-01-17 ASSESSMENT — TONOMETRY
OS_IOP_MMHG: 11
IOP_METHOD: ICARE
OD_IOP_MMHG: 08

## 2019-01-17 NOTE — NURSING NOTE
Chief Complaints and History of Present Illnesses   Patient presents with     Cornea Transplant Follow Up     Chief Complaint(s) and History of Present Illness(es)     Cornea Transplant Follow Up     Laterality: right eye    Onset: 3 months ago              Comments     Pt. States that VA RE has been getting progressively worse RE since last visit.  Was told by Dr. Jackman there is swelling in retina.  No pain BE.  Marci Dickens COT 10:48 AM January 17, 2019

## 2019-01-17 NOTE — PROGRESS NOTES
CC: 87 year old male presents for blurry vision s/p penetrating keratoplasty (PK) to right eye    HPI: History of HSV keratitis with scarring now s/p pentrating keratoplasty (PKP) x3 (last 2000). Most recently with right orbital fracture with PK dehiscence from 7 to 12 o'clock on 6/1/2018.     Interval Update: has had a few CTL exchanges with Dr. Varma since last visit    Ocular Meds:        FML at bedtime left eye   Acyclovir 400 mg twice a day   Prednisolone qid right     Assessment/Plan:    1. S/P Triple penetrating keratoplasty (PK) OD 2000 with Dr. Lozano  Now with recent injury 6/1/18 with dehiscence of graft from 7 to 12 o'clock with iris prolapse, wound well approximated with buried sutures and dermabond corneal glue     - Glue and few loose sutures removed last visit  - dinesh with significant cyl at 161, temporal and supero temp sutures pulled today, 4 remaining  - oflox qid x 4 days  - decrease pred to bid  - frequent PFATs    2. Pseudophakia both eyes  - s/p Cataract extraction with Dr. Varma 2013    3. Posterior capsular opacity (PCO), left eye:  - continue to monitor    4. History of recurrent Herpes simples virus keratitis   s/p penetrating keratoplasty (PK)  - continue acyclovir 400mg BID    5. 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 Jackman  - Follows with Dr. Varma for annual exam     6. Steroid responder per history  - good IOP today     RTC 6 weeks    Michelle Wu MD  PGY-5, Cornea Fellow    Complete documentation of historical and exam elements from today's encounter can be found in the full encounter summary report (not reduplicated in this progress note). I personally obtained the chief complaint(s) and history of present illness.  I confirmed and edited as necessary the review of systems, past medical/surgical history, family history, social history, and examination findings as documented by others.  I  examined the patient myself, and I personally reviewed the relevant tests, images, and reports as documented above. I formulated and edited as necessary the assessment and plan and discussed the findings and management plan with the patient and family.     I personally interpreted the diagnostic / imaging study and have edited the interpretation as needed.    Modesto Farley MD, MA  Director, Cornea & Anterior Segment  [facility] Department of Ophthalmology & Visual Neuroscience

## 2019-03-29 ENCOUNTER — TRANSFERRED RECORDS (OUTPATIENT)
Dept: HEALTH INFORMATION MANAGEMENT | Facility: CLINIC | Age: 84
End: 2019-03-29

## 2019-04-18 ENCOUNTER — OFFICE VISIT (OUTPATIENT)
Dept: OPHTHALMOLOGY | Facility: CLINIC | Age: 84
End: 2019-04-18
Attending: OPHTHALMOLOGY
Payer: MEDICARE

## 2019-04-18 DIAGNOSIS — Z94.7 STATUS POST PENETRATING KERATOPLASTY: Primary | ICD-10-CM

## 2019-04-18 DIAGNOSIS — Z94.7 STATUS POST PENETRATING KERATOPLASTY: ICD-10-CM

## 2019-04-18 DIAGNOSIS — T86.8409 CORNEAL TRANSPLANT REJECTION: Primary | ICD-10-CM

## 2019-04-18 PROCEDURE — G0463 HOSPITAL OUTPT CLINIC VISIT: HCPCS | Mod: ZF

## 2019-04-18 PROCEDURE — 92025 CPTRIZED CORNEAL TOPOGRAPHY: CPT | Mod: ZF | Performed by: OPHTHALMOLOGY

## 2019-04-18 RX ORDER — FLUOROMETHOLONE 0.1 %
1 SUSPENSION, DROPS(FINAL DOSAGE FORM)(ML) OPHTHALMIC (EYE) AT BEDTIME
Refills: 11 | COMMUNITY
Start: 2019-02-13

## 2019-04-18 RX ORDER — OFLOXACIN 3 MG/ML
1 SOLUTION/ DROPS OPHTHALMIC 4 TIMES DAILY
Qty: 10 ML | Refills: 0 | Status: SHIPPED | OUTPATIENT
Start: 2019-04-18

## 2019-04-18 RX ORDER — PREDNISOLONE ACETATE 10 MG/ML
1 SUSPENSION/ DROPS OPHTHALMIC 4 TIMES DAILY
Qty: 10 ML | Refills: 0 | Status: SHIPPED | OUTPATIENT
Start: 2019-04-18 | End: 2019-05-16

## 2019-04-18 ASSESSMENT — VISUAL ACUITY
METHOD: SNELLEN - LINEAR
OS_CC: 20/40
OD_PH_CC: 20/100
OD_PH_CC+: +2
OD_CC: 20/200

## 2019-04-18 ASSESSMENT — REFRACTION_WEARINGRX
OD_AXIS: 160
OS_ADD: +2.50
SPECS_TYPE: BIFOCAL
OD_ADD: +2.50
OS_CYLINDER: +2.00
OD_CYLINDER: +4.50
OD_SPHERE: -2.50
OS_SPHERE: -2.50
OS_AXIS: 160

## 2019-04-18 ASSESSMENT — TONOMETRY
IOP_METHOD: ICARE
OD_IOP_MMHG: 10
OS_IOP_MMHG: 10

## 2019-04-18 ASSESSMENT — SLIT LAMP EXAM - LIDS: COMMENTS: NORMAL

## 2019-04-18 ASSESSMENT — CONF VISUAL FIELD
METHOD: COUNTING FINGERS
OD_NORMAL: 1
OS_NORMAL: 1

## 2019-04-18 NOTE — NURSING NOTE
Chief Complaints and History of Present Illnesses   Patient presents with     Follow Up     Chief Complaint(s) and History of Present Illness(es)     Follow Up     Associated symptoms: tearing (intermittent).  Negative for eye pain    Treatments tried: eye drops    Pain scale: 0/10              Comments     Follow up of a Triple penetrating keratoplasty (PK) OD 2000 with Dr. Lozano    Patient states that both eyes have been feeling comfortable since his last exam, 3 month ago.  Occasionally he wakes with his right eye stuck shut.  Using a warm compress helps to open the eye.    Merlyn Abarca COT 9:45 AM April 18, 2019

## 2019-04-18 NOTE — PATIENT INSTRUCTIONS
Start ofloxacin (tan top) 4x/day in the right eye for 4 days, then stop    Start prednisolone (pink top) 4x/day in the right eye until next visit     Hold FML drops until next visit     Follow up 1 mo

## 2019-04-18 NOTE — PROGRESS NOTES
CC: 87 year old male presents for blurry vision s/p penetrating keratoplasty (PK) to right eye    HPI: History of HSV keratitis with scarring now s/p pentrating keratoplasty (PKP) x3 (last 2000). Most recently with right orbital fracture with PK dehiscence from 7 to 12 o'clock on 6/1/2018.     Interval Update: Denies change in vision, redness, or eye pain since last visit in 1/2019. No longer taking pred forte.    Ocular Meds:        Acyclovir 400 mg twice a day   FML qhs right eye    Assessment/Plan:    1. S/P Triple penetrating keratoplasty (PK) OD 2000 with Dr. Lozano  Had recent injury 6/1/18 with dehiscence of graft from 7 to 12 o'clock with iris prolapse, wound well approximated with buried sutures and dermabond corneal glue which has now been removed    - dinesh with significant cyl at 165, temporal and supero temp sutures pulled at last visit 1/17/19, 4 remaining. Removal of 3 temporal sutures today, 1 suture remains at 8 oclock  -Timeout was performed. 1 drop of proparacaine and 1 drop of vigamox placed prior to procedure. 3 sutures removed using 25g needle and jeweler forceps.  1 drop of fluorescein placed after procedure, patient confirmed to be kasey negative. 1 drop of vigamox placed. There were no complications.  - start oflox qid x 4 days  - frequent PFATs    -graft clear but 2+ mixed cell today - no other signs suggestive of rejection.   Recommend restart pred forte 4x/day (history of steroid response). F/u 1mo with fellow    2. Pseudophakia both eyes  - s/p Cataract extraction with Dr. Varma 2013    3. Posterior capsular opacity (PCO), left eye:  - continue to monitor    4. History of recurrent Herpes simples virus keratitis   s/p penetrating keratoplasty (PK)  - continue acyclovir 400mg BID    5. 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 Jackman  - Follows with Dr. Varma for annual exam     6. Steroid responder per  history  - good IOP today     RTC 4 weeks VT, dinesh right eye, MRX    Elvira Reese MD   Ophthalmology PGY-4      ~~~~~~~~~~~~~~~~~~~~~~~~~~~~~~~~~~~~~~~~~~~~~~~~~~~~~~~~~~~~~~~~    Complete documentation of historical and exam elements from today's encounter can be found in the full encounter summary report (not reduplicated in this progress note). I personally obtained the chief complaint(s) and history of present illness.  I confirmed and edited as necessary the review of systems, past medical/surgical history, family history, social history, and examination findings as documented by others.  I examined the patient myself, and I personally reviewed the relevant tests, images, and reports as documented above. I formulated and edited as necessary the assessment and plan and discussed the findings and management plan with the patient and family.     Modesto Farley MD, MA  Director, Cornea & Anterior Segment  [facility] Department of Ophthalmology & Visual Neuroscience

## 2019-05-16 ENCOUNTER — OFFICE VISIT (OUTPATIENT)
Dept: OPHTHALMOLOGY | Facility: CLINIC | Age: 84
End: 2019-05-16
Attending: OPHTHALMOLOGY
Payer: MEDICARE

## 2019-05-16 DIAGNOSIS — Z94.7 STATUS POST PENETRATING KERATOPLASTY: Primary | ICD-10-CM

## 2019-05-16 DIAGNOSIS — T86.8409 CORNEAL TRANSPLANT REJECTION: ICD-10-CM

## 2019-05-16 PROCEDURE — G0463 HOSPITAL OUTPT CLINIC VISIT: HCPCS | Mod: ZF

## 2019-05-16 PROCEDURE — 92025 CPTRIZED CORNEAL TOPOGRAPHY: CPT | Mod: ZF | Performed by: OPHTHALMOLOGY

## 2019-05-16 PROCEDURE — 92015 DETERMINE REFRACTIVE STATE: CPT | Mod: ZF

## 2019-05-16 RX ORDER — PREDNISOLONE ACETATE 10 MG/ML
1 SUSPENSION/ DROPS OPHTHALMIC 3 TIMES DAILY
Qty: 10 ML | Refills: 0 | Status: SHIPPED | OUTPATIENT
Start: 2019-05-16

## 2019-05-16 ASSESSMENT — REFRACTION_MANIFEST
OS_CYLINDER: +2.50
OD_AXIS: 120
OS_ADD: +2.50
OS_AXIS: 166
OD_CYLINDER: +4.75
OS_SPHERE: -2.25
OD_ADD: +2.50
OD_SPHERE: -2.75

## 2019-05-16 ASSESSMENT — REFRACTION_WEARINGRX
OD_SPHERE: -2.50
OS_AXIS: 160
OS_ADD: +2.50
SPECS_TYPE: BIFOCAL
OS_CYLINDER: +2.00
OS_SPHERE: -2.50
OD_ADD: +2.50
OD_CYLINDER: +4.50
OD_AXIS: 160

## 2019-05-16 ASSESSMENT — VISUAL ACUITY
OD_PH_CC: 20/70
OD_CC: 20/200
OS_CC: 20/40
CORRECTION_TYPE: GLASSES
OD_PH_CC+: -2
METHOD: SNELLEN - LINEAR

## 2019-05-16 ASSESSMENT — SLIT LAMP EXAM - LIDS
COMMENTS: NORMAL
COMMENTS: DERMATOCHALASIS

## 2019-05-16 ASSESSMENT — TONOMETRY
OS_IOP_MMHG: 14
IOP_METHOD: ICARE
OD_IOP_MMHG: 11

## 2019-05-16 ASSESSMENT — CONF VISUAL FIELD
OS_NORMAL: 1
OD_SUPERIOR_NASAL_RESTRICTION: 3
OD_SUPERIOR_TEMPORAL_RESTRICTION: 3

## 2019-05-16 ASSESSMENT — PACHYMETRY: OD_CT(UM): 575

## 2019-05-16 NOTE — PROGRESS NOTES
CC: 87 year old male presents for blurry vision s/p penetrating keratoplasty (PK) to right eye    HPI: History of HSV keratitis with scarring now s/p pentrating keratoplasty (PKP) x3 (last 2000). Most recently with right orbital fracture with PK dehiscence from 7 to 12 o'clock on 6/1/2018.     Interval Update: Here for 1 month follow up. Doing well no pain. No redness or photophobia.     Ocular Meds:   Acyclovir 400 mg twice a day   Pred QID OD    Assessment/Plan:    1. S/P Triple penetrating keratoplasty (PK) OD 2000 with Dr. Lozano  Had recent injury 6/1/18 with dehiscence of graft from 7 to 12 o'clock with iris prolapse, wound well approximated with buried sutures and dermabond corneal glue which has now been removed    -s/p suture removal last visit  -dinesh improved with less cyl --> 5.66 D cyl at 171 degrees  -1 residual suture, monitor (not in axis of astig)  -IOP 11 today after restarting pred QID (hx of steroid response) - monitor  -AC reaction noted previously is resolved  -start pred taper - TID x 2 weeks, BID x 2 weeks, once daily until next visit  -check IOP, pachy, AC reaction right eye next visit  - MRx given today with instructions about high astigmatism and that he should gradually try wearing Rx  - pt instructed about high astig and likely better vision with RGP/scleral fit, but he wishes to defer for now and understands that vision will likely be less sharp with glasses alone    2. Pseudophakia both eyes  - s/p Cataract extraction with Dr. Varma 2013    3. Posterior capsular opacity (PCO), left eye:  - continue to monitor    4. History of recurrent Herpes simples virus keratitis   s/p penetrating keratoplasty (PK)  - continue acyclovir 400mg BID    5. 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 Jackman  - Follows with Dr. Varma for annual exam     6. Steroid responder per history  - good IOP today     F/u 6 weeks - pachy  OD    Hermelinda Cevallos MD  PGY-5, Cornea Fellow  Ophthalmology    Complete documentation of historical and exam elements from today's encounter can be found in the full encounter summary report (not reduplicated in this progress note). I personally obtained the chief complaint(s) and history of present illness.  I confirmed and edited as necessary the review of systems, past medical/surgical history, family history, social history, and examination findings as documented by others; and I examined the patient myself. I personally reviewed the relevant tests, images, and reports as documented above. I formulated and edited as necessary the assessment and plan and discussed the findings and management plan with the patient and family.     Hermelinda Cevallos MD  Cornea Fellow

## 2019-05-16 NOTE — NURSING NOTE
Chief Complaints and History of Present Illnesses   Patient presents with     Cornea Transplant Follow Up     Chief Complaint(s) and History of Present Illness(es)     Cornea Transplant Follow Up     Laterality: right eye    Onset: 1 month ago              Comments     Pt. States that he is doing well.  No change in VA BE.  No pain BE.  Marci RICO 10:09 AM May 16, 2019

## 2019-05-16 NOTE — PATIENT INSTRUCTIONS
Eye Drop Instructions:    1. Prednisolone (pink/white top) - 1 drop 3 times per day for 2 weeks, then 1 drop 2 times per day for 2 weeks, then one drop once daily until next visit.   2. Continue Preservative-free lubricating tears (i.e Refresh) - 1 drop every 1-2 hours    Continue Acyclovir pills - 400mg twice per day.    If you develop severe pain, an acute drop in vision, new flashes or floaters, or worsening eye redness, please contact the [facility] immediately at [phone].

## 2019-06-27 ENCOUNTER — OFFICE VISIT (OUTPATIENT)
Dept: OPHTHALMOLOGY | Facility: CLINIC | Age: 84
End: 2019-06-27
Attending: OPHTHALMOLOGY
Payer: MEDICARE

## 2019-06-27 DIAGNOSIS — T86.8409 CORNEAL TRANSPLANT REJECTION: ICD-10-CM

## 2019-06-27 DIAGNOSIS — Z94.7 STATUS POST PENETRATING KERATOPLASTY: ICD-10-CM

## 2019-06-27 PROCEDURE — G0463 HOSPITAL OUTPT CLINIC VISIT: HCPCS | Mod: ZF

## 2019-06-27 ASSESSMENT — CONF VISUAL FIELD
OS_NORMAL: 1
OD_SUPERIOR_TEMPORAL_RESTRICTION: 3
OD_INFERIOR_TEMPORAL_RESTRICTION: 3

## 2019-06-27 ASSESSMENT — VISUAL ACUITY
OD_PH_CC: 20/125
OD_CC: 20/200
METHOD: SNELLEN - LINEAR
OS_CC: 20/30
CORRECTION_TYPE: GLASSES
OS_CC+: -3

## 2019-06-27 ASSESSMENT — TONOMETRY
OD_IOP_MMHG: 10
OS_IOP_MMHG: 8
IOP_METHOD: ICARE

## 2019-06-27 ASSESSMENT — PACHYMETRY
OD_CT(UM): 568
OS_CT(UM): 524

## 2019-06-27 ASSESSMENT — SLIT LAMP EXAM - LIDS
COMMENTS: DERMATOCHALASIS
COMMENTS: NORMAL

## 2019-06-27 NOTE — PROGRESS NOTES
CC: 87 year old male presents for blurry vision s/p penetrating keratoplasty (PK) to right eye    HPI: History of HSV keratitis with scarring now s/p pentrating keratoplasty (PKP) x3 (last 2000). Most recently with right orbital fracture with PK dehiscence from 7 to 12 o'clock on 6/1/2018.     Interval Update: States vision stable since last visit. Has intermittent diplopia, unsure whether monocular or binocular, improved with pinhole today. Finished pred taper and now on daily right eye.    Ocular Meds:   Acyclovir 400 mg twice a day   Prednisolone daily right eye     Assessment/Plan:    1. S/P Triple penetrating keratoplasty (PK) OD 2000 with Dr. Lozano  Had recent injury 6/1/18 with dehiscence of graft from 7 to 12 o'clock with iris prolapse, wound well approximated with buried sutures and dermabond corneal glue which has now been removed    -dinesh last visit improved with less cyl --> 5.66 D cyl at 171 degrees  -1 residual suture, monitor (not in axis of astig)  -IOP 10 today  -AC reaction noted previously is resolved  -completed pred taper, now on pred daily  -pachy ok at 568 today  - new Mrx given last visit, patient wearing today, states vision not ideal with glasses but still would like to defer RGP/scleral fit as concerned about tremor  -cont pred daily, patient states he needs no refills    2. Pseudophakia both eyes  - s/p Cataract extraction with Dr. Varma 2013    3. Posterior capsular opacity (PCO), left eye:  - continue to monitor    4. History of recurrent Herpes simples virus keratitis     s/p penetrating keratoplasty (PK)  - continue acyclovir 400mg BID    5. 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 Jackman  - Follows with Dr. Varma for annual exam     6. Steroid responder per history  - good IOP today     Michelle Wu MD  PGY-5, Cornea  Fellow      ~~~~~~~~~~~~~~~~~~~~~~~~~~~~~~~~~~~~~~~~~~~~~~~~~~~~~~~~~~~~~~~~    Complete documentation of historical and exam elements from today's encounter can be found in the full encounter summary report (not reduplicated in this progress note). I personally obtained the chief complaint(s) and history of present illness.  I confirmed and edited as necessary the review of systems, past medical/surgical history, family history, social history, and examination findings as documented by others.  I examined the patient myself, and I personally reviewed the relevant tests, images, and reports as documented above. I formulated and edited as necessary the assessment and plan and discussed the findings and management plan with the patient and family.     Modesto Farley MD, MA  Director, Cornea & Anterior Segment  [facility] Department of Ophthalmology & Visual Neuroscience

## 2019-06-27 NOTE — NURSING NOTE
Chief Complaints and History of Present Illnesses   Patient presents with     Follow Up      Chief Complaint(s) and History of Present Illness(es)     Follow Up     Laterality: right eye    Associated symptoms: Negative for redness, tearing and eye pain    Treatments tried: eye drops              Comments     6 week follow up of s/p pentrating keratoplasty (PKP) x3 right eye  No changes within the last 6 weeks per patient.  Vision is the same both eyes.  Eye meds: Acyclovir 400 mg twice a day, PF 1 x daily  LASHELL Dorman 6/27/2019 9:44 AM

## 2019-09-13 ENCOUNTER — TRANSFERRED RECORDS (OUTPATIENT)
Dept: HEALTH INFORMATION MANAGEMENT | Facility: CLINIC | Age: 84
End: 2019-09-13

## (undated) DEVICE — BLADE KNIFE BEAVER MICROSHARP GREEN 377515

## (undated) DEVICE — EYE NDL RETROBULBAR 25GA 60-111637

## (undated) DEVICE — EYE PREP BETADINE 5% SOLUTION 30ML 0065-0411-30

## (undated) DEVICE — ADHESIVE SWIFTSET 0.8ML OCTYL SS6

## (undated) DEVICE — SOL NACL 0.9% 10ML VIAL 0409-4888-02

## (undated) DEVICE — SYR 05ML LL W/O NDL

## (undated) DEVICE — EYE CANN IRR 27GA ANTERIOR CHAMBER 581280

## (undated) DEVICE — SU PLAIN FAST ABSORB 6-0 PC-1 18" 1916G

## (undated) DEVICE — EYE SPONGE SPEAR WECK CEL 0008685

## (undated) DEVICE — EYE SHIELD PLASTIC CLEAR UNIVERSAL K9-6050

## (undated) DEVICE — NDL 30GA 0.5" 305106

## (undated) DEVICE — EYE DRSG PAD OVAL

## (undated) DEVICE — Device

## (undated) DEVICE — APPLICATOR COTTON TIP 3" 9325

## (undated) DEVICE — EYE INSTRUMENT WIPE MEROCEL W/ADHESIVE 223625

## (undated) DEVICE — LINEN TOWEL PACK X5 5464

## (undated) DEVICE — PACK CATARACT UMMC

## (undated) DEVICE — SYR 01ML LL W/O NDL LATEX FREE 309628

## (undated) DEVICE — SYR 01ML 27GA 0.5" NDL TBC 309623

## (undated) DEVICE — EYE FLUORESCEIN OPHTHALMIC STRIP FLO-GLO 1272111

## (undated) DEVICE — SU VICRYL 6-0 S-29 12" J556G

## (undated) DEVICE — SU ETHILON 10-0 CS160-8 DA 12" 9030

## (undated) RX ORDER — CEFAZOLIN SODIUM 500 MG/2.2ML
INJECTION, POWDER, FOR SOLUTION INTRAMUSCULAR; INTRAVENOUS
Status: DISPENSED
Start: 2018-06-01

## (undated) RX ORDER — TRIAMCINOLONE ACETONIDE 40 MG/ML
INJECTION, SUSPENSION INTRA-ARTICULAR; INTRAMUSCULAR
Status: DISPENSED
Start: 2018-06-01

## (undated) RX ORDER — BALANCED SALT SOLUTION 6.4; .75; .48; .3; 3.9; 1.7 MG/ML; MG/ML; MG/ML; MG/ML; MG/ML; MG/ML
SOLUTION OPHTHALMIC
Status: DISPENSED
Start: 2018-06-01

## (undated) RX ORDER — CEFAZOLIN SODIUM 1 G/3ML
INJECTION, POWDER, FOR SOLUTION INTRAMUSCULAR; INTRAVENOUS
Status: DISPENSED
Start: 2018-06-01

## (undated) RX ORDER — FENTANYL CITRATE 50 UG/ML
INJECTION, SOLUTION INTRAMUSCULAR; INTRAVENOUS
Status: DISPENSED
Start: 2018-06-02

## (undated) RX ORDER — LIDOCAINE HYDROCHLORIDE AND EPINEPHRINE 10; 10 MG/ML; UG/ML
INJECTION, SOLUTION INFILTRATION; PERINEURAL
Status: DISPENSED
Start: 2018-06-01

## (undated) RX ORDER — DEXAMETHASONE SODIUM PHOSPHATE 4 MG/ML
INJECTION, SOLUTION INTRA-ARTICULAR; INTRALESIONAL; INTRAMUSCULAR; INTRAVENOUS; SOFT TISSUE
Status: DISPENSED
Start: 2018-06-01

## (undated) RX ORDER — PHENYLEPHRINE HCL IN 0.9% NACL 1 MG/10 ML
SYRINGE (ML) INTRAVENOUS
Status: DISPENSED
Start: 2018-06-01

## (undated) RX ORDER — HYDROMORPHONE HYDROCHLORIDE 1 MG/ML
INJECTION, SOLUTION INTRAMUSCULAR; INTRAVENOUS; SUBCUTANEOUS
Status: DISPENSED
Start: 2018-06-01

## (undated) RX ORDER — ONDANSETRON 2 MG/ML
INJECTION INTRAMUSCULAR; INTRAVENOUS
Status: DISPENSED
Start: 2018-06-01

## (undated) RX ORDER — HYDROMORPHONE HCL/0.9% NACL/PF 0.2MG/0.2
SYRINGE (ML) INTRAVENOUS
Status: DISPENSED
Start: 2018-06-02

## (undated) RX ORDER — LIDOCAINE HYDROCHLORIDE 20 MG/ML
INJECTION, SOLUTION EPIDURAL; INFILTRATION; INTRACAUDAL; PERINEURAL
Status: DISPENSED
Start: 2018-06-01

## (undated) RX ORDER — FENTANYL CITRATE 50 UG/ML
INJECTION, SOLUTION INTRAMUSCULAR; INTRAVENOUS
Status: DISPENSED
Start: 2018-06-01

## (undated) RX ORDER — BUPIVACAINE HYDROCHLORIDE 7.5 MG/ML
INJECTION, SOLUTION EPIDURAL; RETROBULBAR
Status: DISPENSED
Start: 2018-06-01

## (undated) RX ORDER — LIDOCAINE HYDROCHLORIDE AND EPINEPHRINE 5; 5 MG/ML; UG/ML
INJECTION, SOLUTION INFILTRATION; PERINEURAL
Status: DISPENSED
Start: 2018-06-01

## (undated) RX ORDER — TOBRAMYCIN AND DEXAMETHASONE 3; 1 MG/ML; MG/ML
SUSPENSION/ DROPS OPHTHALMIC
Status: DISPENSED
Start: 2018-06-01